# Patient Record
Sex: FEMALE | Race: BLACK OR AFRICAN AMERICAN | Employment: FULL TIME | URBAN - METROPOLITAN AREA
[De-identification: names, ages, dates, MRNs, and addresses within clinical notes are randomized per-mention and may not be internally consistent; named-entity substitution may affect disease eponyms.]

---

## 2017-05-18 ENCOUNTER — OFFICE VISIT (OUTPATIENT)
Dept: INTERNAL MEDICINE CLINIC | Age: 57
End: 2017-05-18

## 2017-05-18 VITALS
WEIGHT: 156.4 LBS | TEMPERATURE: 100 F | SYSTOLIC BLOOD PRESSURE: 125 MMHG | HEIGHT: 66 IN | OXYGEN SATURATION: 99 % | BODY MASS INDEX: 25.13 KG/M2 | HEART RATE: 72 BPM | DIASTOLIC BLOOD PRESSURE: 82 MMHG | RESPIRATION RATE: 16 BRPM

## 2017-05-18 DIAGNOSIS — J01.91 ACUTE RECURRENT SINUSITIS, UNSPECIFIED LOCATION: Primary | ICD-10-CM

## 2017-05-18 RX ORDER — AZITHROMYCIN 250 MG/1
TABLET, FILM COATED ORAL
Qty: 6 TAB | Refills: 0 | Status: SHIPPED | OUTPATIENT
Start: 2017-05-18 | End: 2017-05-23

## 2017-05-18 RX ORDER — HYDROCODONE POLISTIREX AND CHLORPHENIRAMINE POLISTIREX 10; 8 MG/5ML; MG/5ML
5 SUSPENSION, EXTENDED RELEASE ORAL
Qty: 115 ML | Refills: 0 | Status: SHIPPED | OUTPATIENT
Start: 2017-05-18 | End: 2017-10-03 | Stop reason: ALTCHOICE

## 2017-05-18 NOTE — LETTER
NOTIFICATION RETURN TO WORK / SCHOOL 
 
5/18/2017 11:45 AM 
 
[phone] [phone] To Whom It May Concern: 
 
Yesenia Almazan is currently under the care of [facility]. She will return to work/school on: 5/19/17 If there are questions or concerns please have the patient contact our office. Sincerely, Rocky Hicks,

## 2017-05-18 NOTE — PATIENT INSTRUCTIONS
Sinusitis: Care Instructions  Your Care Instructions    Sinusitis is an infection of the lining of the sinus cavities in your head. Sinusitis often follows a cold. It causes pain and pressure in your head and face. In most cases, sinusitis gets better on its own in 1 to 2 weeks. But some mild symptoms may last for several weeks. Sometimes antibiotics are needed. Follow-up care is a key part of your treatment and safety. Be sure to make and go to all appointments, and call your doctor if you are having problems. It's also a good idea to know your test results and keep a list of the medicines you take. How can you care for yourself at home? · Take an over-the-counter pain medicine, such as acetaminophen (Tylenol), ibuprofen (Advil, Motrin), or naproxen (Aleve). Read and follow all instructions on the label. · If the doctor prescribed antibiotics, take them as directed. Do not stop taking them just because you feel better. You need to take the full course of antibiotics. · Be careful when taking over-the-counter cold or flu medicines and Tylenol at the same time. Many of these medicines have acetaminophen, which is Tylenol. Read the labels to make sure that you are not taking more than the recommended dose. Too much acetaminophen (Tylenol) can be harmful. · Breathe warm, moist air from a steamy shower, a hot bath, or a sink filled with hot water. Avoid cold, dry air. Using a humidifier in your home may help. Follow the directions for cleaning the machine. · Use saline (saltwater) nasal washes to help keep your nasal passages open and wash out mucus and bacteria. You can buy saline nose drops at a grocery store or drugstore. Or you can make your own at home by adding 1 teaspoon of salt and 1 teaspoon of baking soda to 2 cups of distilled water. If you make your own, fill a bulb syringe with the solution, insert the tip into your nostril, and squeeze gently. [place] Maffucci your nose.   · Put a hot, wet towel or a warm gel pack on your face 3 or 4 times a day for 5 to 10 minutes each time. · Try a decongestant nasal spray like oxymetazoline (Afrin). Do not use it for more than 3 days in a row. Using it for more than 3 days can make your congestion worse. When should you call for help? Call your doctor now or seek immediate medical care if:  · You have new or worse swelling or redness in your face or around your eyes. · You have a new or higher fever. Watch closely for changes in your health, and be sure to contact your doctor if:  · You have new or worse facial pain. · The mucus from your nose becomes thicker (like pus) or has new blood in it. · You are not getting better as expected. Where can you learn more? Go to http://matt-hannah.info/. Enter C735 in the search box to learn more about \"Sinusitis: Care Instructions. \"  Current as of: July 29, 2016  Content Version: 11.2  © 6274-2492 Healthwise, Incorporated. Care instructions adapted under license by [employer] (which disclaims liability or warranty for this information). If you have questions about a medical condition or this instruction, always ask your healthcare professional. [place] [address] any warranty or liability for your use of this information.

## 2017-05-18 NOTE — PROGRESS NOTES
HISTORY OF PRESENT ILLNESS  Mai Buchanan is a 62 y.o. female. HPI  Upper Respiratory Infection  Patient complains of possible sinusitis. Symptoms include congestion, sore throat and cough. Onset of symptoms was 7 weeks ago, unchanged since that time. She also c/o headache described as frontal, low grade fever and sore throat. She is drinking moderate amounts of fluids. . Evaluation to date: none. Treatment to date: Ibuprofen and home remedies with some symptom improvement. +sick contact is her boss    Allergies   Allergen Reactions    Vibramycin [Doxycycline Calcium] Nausea Only       Past Medical History:   Diagnosis Date    Anxiety 10/20/2010    Headache     Hypercholesterolemia     Hypertension        Family History   Problem Relation Age of Onset    Breast Cancer Sister     Breast Cancer Sister        Social History   Substance Use Topics    Smoking status: Never Smoker    Smokeless tobacco: Never Used    Alcohol use No        Current Outpatient Prescriptions   Medication Sig    azithromycin (ZITHROMAX) 250 mg tablet Take 2 tablets today, then take 1 tablet daily    chlorpheniramine-HYDROcodone (TUSSIONEX) 10-8 mg/5 mL suspension Take 5 mL by mouth every twelve (12) hours as needed for Cough. Max Daily Amount: 10 mL.  multivitamin with iron-mineral 0.8 mg Cmpk Take  by mouth.  fluticasone (FLONASE) 50 mcg/actuation nasal spray 2 Sprays by Both Nostrils route daily.  cetirizine (ZYRTEC) 10 mg tablet Take 1 Tab by mouth daily as needed for Allergies.  butalbital-acetaminophen-caffeine (FIORICET, ESGIC) -40 mg per tablet Take 1 tablet by mouth every six (6) hours as needed for Headache.  fluocinoNIDE (LIDEX) 0.05 % topical cream Apply  to affected area two (2) times a day.  FOLIC ACID PO Take  by mouth.  UBIDECARENONE (CO Q-10 PO) Take  by mouth. No current facility-administered medications for this visit.          Past Surgical History:   Procedure Laterality Date    HX GYN      etopic pregnancy     ROS  See HPI    Visit Vitals    /82 (BP 1 Location: Left arm)    Pulse 72    Temp 100 °F (37.8 °C) (Oral)    Resp 16    Ht 5' 6\" (1.676 m)    Wt 156 lb 6.4 oz (70.9 kg)    LMP 05/03/2016    SpO2 99%    BMI 25.24 kg/m2     Physical Exam   HENT:   Right Ear: No middle ear effusion. Left Ear:  No middle ear effusion. Nose: Mucosal edema present. No rhinorrhea. Right sinus exhibits no maxillary sinus tenderness and no frontal sinus tenderness. Left sinus exhibits no maxillary sinus tenderness and no frontal sinus tenderness. Mouth/Throat: Mucous membranes are normal. Posterior oropharyngeal erythema present. Cardiovascular: Normal rate and regular rhythm. Pulmonary/Chest: Effort normal and breath sounds normal.       ASSESSMENT and PLAN    ICD-10-CM ICD-9-CM    1. Acute recurrent sinusitis, unspecified location J01.91 461.9 azithromycin (ZITHROMAX) 250 mg tablet      chlorpheniramine-HYDROcodone (TUSSIONEX) 10-8 mg/5 mL suspension     -Advised symptomatic care  -PCP to address HM  -Work note provided  -RTC prn    Additional Instructions: The patient understands that they should contact the office at any time if any questions or concerns develop. They are also aware that they can call our main office number at [phone] at any time if they would like to address any concerns with the physician. They also understand that they should dial 911 if any acute emergency arises. The patient understands that they should give us a minimum of 48 hours to complete prescription refills once they are requested. The patient has also been instructed to contact us by calling the main office number if they have not received feedback within 2 weeks of having any tests completed.   The patient is a aware that they should read all package insert information when picking up the medications and that they should consult the pharmacist of a physician if they have any questions or concerns regarding the prescribed medications. Discussed with the patient new medications given and patient instructed to read pharmacy literature regarding side effects and drug interactions. Instructions for taking the medications were provided to the patient and the consequences of not taking it. Follow-up Disposition:   Return if symptoms worsen or fail to improve. Risk and benefits of new medication discussed in detail when indicated, patient was given the opportunity to ask questions   AVS provided  reviewed diet, exercise and weight control when indicated  Alarm signals discussed. ER precautions reviewed when indicated  Plan of care reviewed with patient. Understanding verbalized and they are in agreement with plan of care.      Paige Dennis DO

## 2017-05-18 NOTE — PROGRESS NOTES
Pt is here for cough, congestion, headache, slight fever x 1 week. Do you have an advance directive no  Request Pt bring a copy of advance directive for scanning. Do you want information on an advance directive no    Pt declined mychart activation. 1. Have you been to the ER, urgent care clinic since your last visit? Hospitalized since your last visit? No    2. Have you seen or consulted any other health care providers outside of the [address] [place] [address] since your last visit? Include any pap smears or colon screening.  No

## 2017-05-18 NOTE — MR AVS SNAPSHOT
Visit Information Date & Time Provider Department Dept. Phone Encounter #  
 5/18/2017 10:45 AM Dipika Paz DO [facility] [employer] [phone] Upcoming Health Maintenance Date Due Hepatitis C Screening 1960 DTaP/Tdap/Td series (1 - Tdap) 2/5/1981 PAP AKA CERVICAL CYTOLOGY 2/5/1981 FOBT Q 1 YEAR AGE 50-75 2/5/2010 INFLUENZA AGE 9 TO ADULT 8/1/2017 BREAST CANCER SCRN MAMMOGRAM 12/30/2017 Allergies as of 5/18/2017  Review Complete On: 5/18/2017 By: Dipika Paz DO Severity Noted Reaction Type Reactions Vibramycin [Doxycycline Calcium]  07/30/2010    Nausea Only Current Immunizations  Never Reviewed No immunizations on file. Not reviewed this visit You Were Diagnosed With   
  
 Codes Comments Acute recurrent sinusitis, unspecified location    -  Primary ICD-10-CM: J01.91 
ICD-9-CM: 461.9 Vitals BP Pulse Temp Resp Height(growth percentile) Weight(growth percentile) 125/82 (BP 1 Location: Left arm) 72 100 °F (37.8 °C) (Oral) 16 5' 6\" (1.676 m) 156 lb 6.4 oz (70.9 kg) LMP SpO2 BMI OB Status Smoking Status 05/03/2016 99% 25.24 kg/m2 Postmenopausal Never Smoker Vitals History BMI and BSA Data Body Mass Index Body Surface Area  
 25.24 kg/m 2 1.82 m 2 Preferred Pharmacy Pharmacy Name Phone [facility] [address] [place] [address] 32 Your Updated Medication List  
  
   
This list is accurate as of: 5/18/17 11:44 AM.  Always use your most recent med list.  
  
  
  
  
 azithromycin 250 mg tablet Commonly known as:  Quynh Joey Take 2 tablets today, then take 1 tablet daily  
  
 butalbital-acetaminophen-caffeine -40 mg per tablet Commonly known as:  Elsie Pickard Take 1 tablet by mouth every six (6) hours as needed for Headache. cetirizine 10 mg tablet Commonly known as:  ZYRTEC  
 Take 1 Tab by mouth daily as needed for Allergies. chlorpheniramine-HYDROcodone 10-8 mg/5 mL suspension Commonly known as:  Muniz Chhaya Take 5 mL by mouth every twelve (12) hours as needed for Cough. Max Daily Amount: 10 mL. CO Q-10 PO Take  by mouth. FLONASE 50 mcg/actuation nasal spray Generic drug:  fluticasone 2 Sprays by Both Nostrils route daily. fluocinoNIDE 0.05 % topical cream  
Commonly known as:  LIDEX Apply  to affected area two (2) times a day. FOLIC ACID PO Take  by mouth.  
  
 multivitamin with iron-mineral 0.8 mg Cmpk Take  by mouth. Prescriptions Printed Refills  
 chlorpheniramine-HYDROcodone (TUSSIONEX) 10-8 mg/5 mL suspension 0 Sig: Take 5 mL by mouth every twelve (12) hours as needed for Cough. Max Daily Amount: 10 mL. Class: Print Route: Oral  
  
Prescriptions Sent to Pharmacy Refills  
 azithromycin (ZITHROMAX) 250 mg tablet 0 Sig: Take 2 tablets today, then take 1 tablet daily Class: Normal  
 Pharmacy: [address] [place] [address] [facility] #: [phone] Patient Instructions Sinusitis: Care Instructions Your Care Instructions Sinusitis is an infection of the lining of the sinus cavities in your head. Sinusitis often follows a cold. It causes pain and pressure in your head and face. In most cases, sinusitis gets better on its own in 1 to 2 weeks. But some mild symptoms may last for several weeks. Sometimes antibiotics are needed. Follow-up care is a key part of your treatment and safety. Be sure to make and go to all appointments, and call your doctor if you are having problems. It's also a good idea to know your test results and keep a list of the medicines you take. How can you care for yourself at home? · Take an over-the-counter pain medicine, such as acetaminophen (Tylenol), ibuprofen (Advil, Motrin), or naproxen (Aleve).  Read and follow all instructions on the label. · If the doctor prescribed antibiotics, take them as directed. Do not stop taking them just because you feel better. You need to take the full course of antibiotics. · Be careful when taking over-the-counter cold or flu medicines and Tylenol at the same time. Many of these medicines have acetaminophen, which is Tylenol. Read the labels to make sure that you are not taking more than the recommended dose. Too much acetaminophen (Tylenol) can be harmful. · Breathe warm, moist air from a steamy shower, a hot bath, or a sink filled with hot water. Avoid cold, dry air. Using a humidifier in your home may help. Follow the directions for cleaning the machine. · Use saline (saltwater) nasal washes to help keep your nasal passages open and wash out mucus and bacteria. You can buy saline nose drops at a grocery store or drugstore. Or you can make your own at home by adding 1 teaspoon of salt and 1 teaspoon of baking soda to 2 cups of distilled water. If you make your own, fill a bulb syringe with the solution, insert the tip into your nostril, and squeeze gently. Tracey Redhead your nose. · Put a hot, wet towel or a warm gel pack on your face 3 or 4 times a day for 5 to 10 minutes each time. · Try a decongestant nasal spray like oxymetazoline (Afrin). Do not use it for more than 3 days in a row. Using it for more than 3 days can make your congestion worse. When should you call for help? Call your doctor now or seek immediate medical care if: 
· You have new or worse swelling or redness in your face or around your eyes. · You have a new or higher fever. Watch closely for changes in your health, and be sure to contact your doctor if: 
· You have new or worse facial pain. · The mucus from your nose becomes thicker (like pus) or has new blood in it. · You are not getting better as expected. Where can you learn more? Go to http://matt-hannah.info/. Enter X057 in the search box to learn more about \"Sinusitis: Care Instructions. \" Current as of: July 29, 2016 Content Version: 11.2 © 3285-0232 [employer]. Care instructions adapted under license by [employer] (which disclaims liability or warranty for this information). If you have questions about a medical condition or this instruction, always ask your healthcare professional. [address] any warranty or liability for your use of this information. Introducing [facility]! Clemente Smith introduces Newstag patient portal. Now you can access parts of your medical record, email your doctor's office, and request medication refills online. 1. In your internet browser, go to [email or web address] 2. Click on the First Time User? Click Here link in the Sign In box. You will see the New Member Sign Up page. 3. Enter your Newstag Access Code exactly as it appears below. You will not need to use this code after youve completed the sign-up process. If you do not sign up before the expiration date, you must request a new code. · Newstag Access Code: [email or web address] Expires: 8/16/2017 11:44 AM 
 
4. Enter the last four digits of your Social Security Number (xxxx) and Date of Birth (mm/dd/yyyy) as indicated and click Submit. You will be taken to the next sign-up page. 5. Create a Newstag ID. This will be your Newstag login ID and cannot be changed, so think of one that is secure and easy to remember. 6. Create a Newstag password. You can change your password at any time. 7. Enter your Password Reset Question and Answer. This can be used at a later time if you forget your password. 8. Enter your e-mail address. You will receive e-mail notification when new information is available in [address] 9. Click Sign Up. You can now view and download portions of your medical record. 10. Click the Download Summary menu link to download a portable copy of your medical information. If you have questions, please visit the Frequently Asked Questions section of the Moncai website. Remember, Moncai is NOT to be used for urgent needs. For medical emergencies, dial 911. Now available from your iPhone and Android! Please provide this summary of care documentation to your next provider. Your primary care clinician is listed as CONSTANZA MAC. If you have any questions after today's visit, please call [phone].

## 2017-05-18 NOTE — LETTER
NOTIFICATION RETURN TO WORK / SCHOOL 
 
5/18/2017 11:41 AM 
 
Ms. Cassi Preciado [facility] [phone]. [phone] To Whom It May Concern: 
 
Cassi Preciado is currently under the care of [facility]. She will return to work/school on: 5/20/17 If there are questions or concerns please have the patient contact our office. Sincerely, Ramiro Pedersen, DO

## 2017-09-27 ENCOUNTER — LAB ONLY (OUTPATIENT)
Dept: INTERNAL MEDICINE CLINIC | Age: 57
End: 2017-09-27

## 2017-09-27 ENCOUNTER — HOSPITAL ENCOUNTER (OUTPATIENT)
Dept: LAB | Age: 57
Discharge: HOME | End: 2017-09-27
Payer: OTHER GOVERNMENT

## 2017-09-27 DIAGNOSIS — Z00.00 PHYSICAL EXAM: Primary | ICD-10-CM

## 2017-09-27 DIAGNOSIS — Z00.00 PHYSICAL EXAM: ICD-10-CM

## 2017-09-27 LAB
ALBUMIN SERPL-MCNC: 3.9 G/DL (ref 3.4–5)
ALBUMIN/GLOB SERPL: 1.3 {RATIO} (ref 0.8–1.7)
ALP SERPL-CCNC: 74 U/L (ref 45–117)
ALT SERPL-CCNC: 42 U/L (ref 13–56)
ANION GAP SERPL CALC-SCNC: 8 MMOL/L (ref 3–18)
AST SERPL-CCNC: 21 U/L (ref 15–37)
BASOPHILS # BLD: 0 K/UL (ref 0–0.06)
BASOPHILS NFR BLD: 1 % (ref 0–2)
BILIRUB SERPL-MCNC: 0.3 MG/DL (ref 0.2–1)
BUN SERPL-MCNC: 13 MG/DL (ref 7–18)
BUN/CREAT SERPL: 18 (ref 12–20)
CALCIUM SERPL-MCNC: 8.9 MG/DL (ref 8.5–10.1)
CHLORIDE SERPL-SCNC: 105 MMOL/L (ref 100–108)
CHOLEST SERPL-MCNC: 204 MG/DL
CO2 SERPL-SCNC: 29 MMOL/L (ref 21–32)
CREAT SERPL-MCNC: 0.72 MG/DL (ref 0.6–1.3)
DIFFERENTIAL METHOD BLD: ABNORMAL
EOSINOPHIL # BLD: 0.1 K/UL (ref 0–0.4)
EOSINOPHIL NFR BLD: 1 % (ref 0–5)
ERYTHROCYTE [DISTWIDTH] IN BLOOD BY AUTOMATED COUNT: 14.5 % (ref 11.6–14.5)
GLOBULIN SER CALC-MCNC: 2.9 G/DL (ref 2–4)
GLUCOSE SERPL-MCNC: 82 MG/DL (ref 74–99)
HCT VFR BLD AUTO: 38.8 % (ref 35–45)
HDLC SERPL-MCNC: 72 MG/DL (ref 40–60)
HDLC SERPL: 2.8 {RATIO} (ref 0–5)
HGB BLD-MCNC: 12 G/DL (ref 12–16)
LDLC SERPL CALC-MCNC: 120.8 MG/DL (ref 0–100)
LIPID PROFILE,FLP: ABNORMAL
LYMPHOCYTES # BLD: 2 K/UL (ref 0.9–3.6)
LYMPHOCYTES NFR BLD: 53 % (ref 21–52)
MCH RBC QN AUTO: 30.5 PG (ref 24–34)
MCHC RBC AUTO-ENTMCNC: 30.9 G/DL (ref 31–37)
MCV RBC AUTO: 98.7 FL (ref 74–97)
MONOCYTES # BLD: 0.3 K/UL (ref 0.05–1.2)
MONOCYTES NFR BLD: 7 % (ref 3–10)
NEUTS SEG # BLD: 1.4 K/UL (ref 1.8–8)
NEUTS SEG NFR BLD: 38 % (ref 40–73)
PLATELET # BLD AUTO: 211 K/UL (ref 135–420)
PMV BLD AUTO: 10.5 FL (ref 9.2–11.8)
POTASSIUM SERPL-SCNC: 3.7 MMOL/L (ref 3.5–5.5)
PROT SERPL-MCNC: 6.8 G/DL (ref 6.4–8.2)
RBC # BLD AUTO: 3.93 M/UL (ref 4.2–5.3)
SODIUM SERPL-SCNC: 142 MMOL/L (ref 136–145)
TRIGL SERPL-MCNC: 56 MG/DL (ref ?–150)
VLDLC SERPL CALC-MCNC: 11.2 MG/DL
WBC # BLD AUTO: 3.8 K/UL (ref 4.6–13.2)

## 2017-09-27 PROCEDURE — 85025 COMPLETE CBC W/AUTO DIFF WBC: CPT | Performed by: INTERNAL MEDICINE

## 2017-09-27 PROCEDURE — 80053 COMPREHEN METABOLIC PANEL: CPT | Performed by: INTERNAL MEDICINE

## 2017-09-27 PROCEDURE — 80061 LIPID PANEL: CPT | Performed by: INTERNAL MEDICINE

## 2017-09-27 PROCEDURE — 36415 COLL VENOUS BLD VENIPUNCTURE: CPT | Performed by: INTERNAL MEDICINE

## 2017-10-03 ENCOUNTER — OFFICE VISIT (OUTPATIENT)
Dept: INTERNAL MEDICINE CLINIC | Age: 57
End: 2017-10-03

## 2017-10-03 VITALS
WEIGHT: 164 LBS | HEART RATE: 69 BPM | TEMPERATURE: 98.2 F | BODY MASS INDEX: 26.36 KG/M2 | RESPIRATION RATE: 18 BRPM | OXYGEN SATURATION: 98 % | DIASTOLIC BLOOD PRESSURE: 84 MMHG | HEIGHT: 66 IN | SYSTOLIC BLOOD PRESSURE: 132 MMHG

## 2017-10-03 DIAGNOSIS — L70.0 ACNE VULGARIS: ICD-10-CM

## 2017-10-03 DIAGNOSIS — J32.9 CHRONIC SINUSITIS, UNSPECIFIED LOCATION: ICD-10-CM

## 2017-10-03 DIAGNOSIS — Z00.00 WELL WOMAN EXAM (NO GYNECOLOGICAL EXAM): Primary | ICD-10-CM

## 2017-10-03 DIAGNOSIS — Z12.39 BREAST CANCER SCREENING: ICD-10-CM

## 2017-10-03 RX ORDER — CLINDAMYCIN AND BENZOYL PEROXIDE 10; 50 MG/G; MG/G
GEL TOPICAL 2 TIMES DAILY
Qty: 1 TUBE | Refills: 1 | Status: SHIPPED | OUTPATIENT
Start: 2017-10-03

## 2017-10-03 NOTE — PATIENT INSTRUCTIONS

## 2017-10-03 NOTE — PROGRESS NOTES
Patient is in the office today for a physical.  Patient would like to get a cream for her hand and for acne. 1. Have you been to the ER, urgent care clinic since your last visit? Hospitalized since your last visit? No    2. Have you seen or consulted any other health care providers outside of the [address] [place] [address] since your last visit? Include any pap smears or colon screening.  No

## 2017-10-03 NOTE — PROGRESS NOTES
.    Subjective:   62 y.o. female for Well Woman Check. Her gyne and breast care is done elsewhere by her Ob-Gyne physician. Patient Active Problem List   Diagnosis Code    Anxiety F41.9    AR (allergic rhinitis) J30.9     Current Outpatient Prescriptions   Medication Sig Dispense Refill    clindamycin-benzoyl peroxide (BENZACLIN) 1-5 % topical gel Apply  to affected area two (2) times a day. Apply to affected area after the skin has been cleansed and dried. 1 Tube 1    multivitamin with iron-mineral 0.8 mg Cmpk Take  by mouth.  fluticasone (FLONASE) 50 mcg/actuation nasal spray 2 Sprays by Both Nostrils route daily. 1 Bottle 2    cetirizine (ZYRTEC) 10 mg tablet Take 1 Tab by mouth daily as needed for Allergies. 30 Tab     butalbital-acetaminophen-caffeine (FIORICET, ESGIC) -40 mg per tablet Take 1 tablet by mouth every six (6) hours as needed for Headache. 30 tablet 0    fluocinoNIDE (LIDEX) 0.05 % topical cream Apply  to affected area two (2) times a day.  60 g 3        Lab Results   Component Value Date/Time    WBC 3.8 09/27/2017 09:00 AM    HGB 12.0 09/27/2017 09:00 AM    HCT 38.8 09/27/2017 09:00 AM    PLATELET 360 85/47/3752 09:00 AM    MCV 98.7 09/27/2017 09:00 AM     Lab Results   Component Value Date/Time    Cholesterol, total 204 09/27/2017 09:00 AM    HDL Cholesterol 72 09/27/2017 09:00 AM    LDL, calculated 120.8 09/27/2017 09:00 AM    Triglyceride 56 09/27/2017 09:00 AM    CHOL/HDL Ratio 2.8 09/27/2017 09:00 AM     Lab Results   Component Value Date/Time    Sodium 142 09/27/2017 09:00 AM    Potassium 3.7 09/27/2017 09:00 AM    Chloride 105 09/27/2017 09:00 AM    CO2 29 09/27/2017 09:00 AM    Anion gap 8 09/27/2017 09:00 AM    Glucose 82 09/27/2017 09:00 AM    BUN 13 09/27/2017 09:00 AM    Creatinine 0.72 09/27/2017 09:00 AM    BUN/Creatinine ratio 18 09/27/2017 09:00 AM    GFR est AA >60 09/27/2017 09:00 AM    GFR est non-AA >60 09/27/2017 09:00 AM    Calcium 8.9 09/27/2017 09:00 AM    Bilirubin, total 0.3 09/27/2017 09:00 AM    ALT (SGPT) 42 09/27/2017 09:00 AM    AST (SGOT) 21 09/27/2017 09:00 AM    Alk. phosphatase 74 09/27/2017 09:00 AM    Protein, total 6.8 09/27/2017 09:00 AM    Albumin 3.9 09/27/2017 09:00 AM    Globulin 2.9 09/27/2017 09:00 AM    A-G Ratio 1.3 09/27/2017 09:00 AM                 ROS: Feeling generally well. No TIA's or unusual headaches, no dysphagia. No prolonged cough. No dyspnea or chest pain on exertion. No abdominal pain, change in bowel habits, black or bloody stools. No urinary tract symptoms. No new or unusual musculoskeletal symptoms. Specific concerns today: pt has chronic sinusitis that has not improved with Flonase and zyrtec. She has recurrent exacerbations and this has been going on for many years. Pt has acne for which I will try a crm. If not improving consider antibiotics or refer to dermatology. Pt is under a lot of stress with her  who she is  from. She will try to f/u with a therapist and let us know if she needs a recommendation. Objective: The patient appears well, alert, oriented x 3, in no distress. Visit Vitals    /84 (BP 1 Location: Left arm, BP Patient Position: Sitting)    Pulse 69    Temp 98.2 °F (36.8 °C) (Oral)    Resp 18    Ht 5' 6\" (1.676 m)    Wt 164 lb (74.4 kg)    LMP 05/03/2016    SpO2 98%    BMI 26.47 kg/m2     ENT normal.  Neck supple. No adenopathy or thyromegaly. SAMMIE. Lungs are clear, good air entry, no wheezes, rhonchi or rales. S1 and S2 normal, no murmurs, regular rate and rhythm. Abdomen soft without tenderness, guarding, mass or organomegaly. Extremities show no edema, normal peripheral pulses. Neurological is normal, no focal findings. Breast and Pelvic exams are deferred. Assessment/Plan:   Well Woman  increase physical activity, call if any problems    ICD-10-CM ICD-9-CM    1. Well woman exam (no gynecological exam) Z00.00 V70.0     [V70.0]   2.  Chronic sinusitis, unspecified location J32.9 473.9 REFERRAL TO ENT-OTOLARYNGOLOGY   3. Breast cancer screening Z12.31 V76.10 LEOPOLDO MAMMO BI SCREENING INCL CAD   4. Acne vulgaris L70.0 706.1 Will treat with clindamycin-benzoyl peroxide (BENZACLIN) 1-5 % topical gel     Follow-up Disposition:  Return in about 1 year (around 10/3/2018) for physical.    Reviewed plan of care. Patient has provided input and agrees with goals.

## 2017-10-03 NOTE — LETTER
NOTIFICATION RETURN TO WORK / SCHOOL 
 
10/3/2017 11:59 AM 
 
Ms. Lisa Early [facility] [phone]. [phone] To Whom It May Concern: 
 
Lisa Early is currently under the care of [facility]. She will return to work/school on: 10/3/17 If there are questions or concerns please have the patient contact our office. Sincerely, Hamlet De La Fuente MD

## 2017-10-03 NOTE — MR AVS SNAPSHOT
Visit Information Date & Time Provider Department Dept. Phone Encounter #  
 10/3/2017 11:00 AM Wil Perez MD [facility] [id number] Follow-up Instructions Return in about 1 year (around 10/3/2018) for physical.  
  
Upcoming Health Maintenance Date Due Hepatitis C Screening 1960 DTaP/Tdap/Td series (1 - Tdap) 2/5/1981 PAP AKA CERVICAL CYTOLOGY 2/5/1981 INFLUENZA AGE 9 TO ADULT 8/1/2017 BREAST CANCER SCRN MAMMOGRAM 12/30/2017 COLONOSCOPY 2/28/2022 Allergies as of 10/3/2017  Review Complete On: 10/3/2017 By: Wil Perez MD  
  
 Severity Noted Reaction Type Reactions Vibramycin [Doxycycline Calcium]  07/30/2010    Nausea Only Current Immunizations  Never Reviewed No immunizations on file. Not reviewed this visit You Were Diagnosed With   
  
 Codes Comments Well woman exam (no gynecological exam)    -  Primary ICD-10-CM: Z00.00 ICD-9-CM: V70.0 [V70.0] Chronic sinusitis, unspecified location     ICD-10-CM: J32.9 ICD-9-CM: 473.9 Breast cancer screening     ICD-10-CM: Z12.31 
ICD-9-CM: V76.10 Acne vulgaris     ICD-10-CM: L70.0 ICD-9-CM: 706.1 Vitals BP Pulse Temp Resp Height(growth percentile) Weight(growth percentile) 132/84 (BP 1 Location: Left arm, BP Patient Position: Sitting) 69 98.2 °F (36.8 °C) (Oral) 18 5' 6\" (1.676 m) 164 lb (74.4 kg) LMP SpO2 BMI OB Status Smoking Status 05/03/2016 98% 26.47 kg/m2 Postmenopausal Never Smoker Vitals History BMI and BSA Data Body Mass Index Body Surface Area  
 26.47 kg/m 2 1.86 m 2 Preferred Pharmacy Pharmacy Name Phone [facility] Your Updated Medication List  
  
   
This list is accurate as of: 10/3/17 12:01 PM.  Always use your most recent med list.  
  
  
  
  
 butalbital-acetaminophen-caffeine -40 mg per tablet Commonly known as:  Aalnna Halls Take 1 tablet by mouth every six (6) hours as needed for Headache. cetirizine 10 mg tablet Commonly known as:  ZYRTEC Take 1 Tab by mouth daily as needed for Allergies. clindamycin-benzoyl peroxide 1-5 % topical gel Commonly known as:  Yaneli Maple Apply  to affected area two (2) times a day. Apply to affected area after the skin has been cleansed and dried. FLONASE 50 mcg/actuation nasal spray Generic drug:  fluticasone 2 Sprays by Both Nostrils route daily. fluocinoNIDE 0.05 % topical cream  
Commonly known as:  LIDEX Apply  to affected area two (2) times a day. multivitamin with iron-mineral 0.8 mg Cmpk Take  by mouth. Prescriptions Sent to Pharmacy Refills  
 clindamycin-benzoyl peroxide (BENZACLIN) 1-5 % topical gel 1 Sig: Apply  to affected area two (2) times a day. Apply to affected area after the skin has been cleansed and dried. Class: Normal  
 Pharmacy: [id number] [place] [address] #: [phone] Route: Topical  
  
We Performed the Following REFERRAL TO ENT-OTOLARYNGOLOGY [JZR33 Custom] Comments:  
 Refer for chronic sinusitis Follow-up Instructions Return in about 1 year (around 10/3/2018) for physical.  
  
To-Do List   
 01/02/2018 Imaging:  LEOPOLDO MAMMO BI SCREENING INCL CAD Referral Information Referral ID Referred By Referred To  
  
 [id number] CONSTANZA MAC [facility] [address] [place], [facility] [address] Phone: [phone] Fax: [phone] Visits Status Start Date End Date 1 New Request 10/3/17 10/3/18 If your referral has a status of pending review or denied, additional information will be sent to support the outcome of this decision. Patient Instructions A Healthy Lifestyle: Care Instructions Your Care Instructions A healthy lifestyle can help you feel good, stay at a healthy weight, and have plenty of energy for both work and play. A healthy lifestyle is something you can share with your whole family. A healthy lifestyle also can lower your risk for serious health problems, such as high blood pressure, heart disease, and diabetes. You can follow a few steps listed below to improve your health and the health of your family. Follow-up care is a key part of your treatment and safety. Be sure to make and go to all appointments, and call your doctor if you are having problems. Its also a good idea to know your test results and keep a list of the medicines you take. How can you care for yourself at home? · Do not eat too much sugar, fat, or fast foods. You can still have dessert and treats now and then. The goal is moderation. · Start small to improve your eating habits. Pay attention to portion sizes, drink less juice and soda pop, and eat more fruits and vegetables. ¨ Eat a healthy amount of food. A 3-ounce serving of meat, for example, is about the size of a deck of cards. Fill the rest of your plate with vegetables and whole grains. ¨ Limit the amount of soda and sports drinks you have every day. Drink more water when you are thirsty. ¨ Eat at least 5 servings of fruits and vegetables every day. It may seem like a lot, but it is not hard to reach this goal. A serving or helping is 1 piece of fruit, 1 cup of vegetables, or 2 cups of leafy, raw vegetables. Have an apple or some carrot sticks as an afternoon snack instead of a candy bar. Try to have fruits and/or vegetables at every meal. 
· Make exercise part of your daily routine. You may want to start with simple activities, such as walking, bicycling, or slow swimming. Try to be active 30 to 60 minutes every day. You do not need to do all 30 to 60 minutes all at once.  For example, you can exercise 3 times a day for 10 or 20 minutes. Moderate exercise is safe for most people, but it is always a good idea to talk to your doctor before starting an exercise program. 
· Keep moving. Trey Tacho the lawn, work in the garden, or [employer]. Take the stairs instead of the elevator at work. · If you smoke, quit. People who smoke have an increased risk for heart attack, stroke, cancer, and other lung illnesses. Quitting is hard, but there are ways to boost your chance of quitting tobacco for good. ¨ Use nicotine gum, patches, or lozenges. ¨ Ask your doctor about stop-smoking programs and medicines. ¨ Keep trying. In addition to reducing your risk of diseases in the future, you will notice some benefits soon after you stop using tobacco. If you have shortness of breath or asthma symptoms, they will likely get better within a few weeks after you quit. · Limit how much alcohol you drink. Moderate amounts of alcohol (up to 2 drinks a day for men, 1 drink a day for women) are okay. But drinking too much can lead to liver problems, high blood pressure, and other health problems. Family health If you have a family, there are many things you can do together to improve your health. · Eat meals together as a family as often as possible. · Eat healthy foods. This includes fruits, vegetables, lean meats and dairy, and whole grains. · Include your family in your fitness plan. Most people think of activities such as jogging or tennis as the way to fitness, but there are many ways you and your family can be more active. Anything that makes you breathe hard and gets your heart pumping is exercise. Here are some tips: 
¨ Walk to do errands or to take your child to school or the bus. ¨ Go for a family bike ride after dinner instead of watching TV. Where can you learn more? Go to http://matt-hannah.info/. Enter K042 in the search box to learn more about \"A Healthy Lifestyle: Care Instructions. \" Current as of: July 26, 2016 Content Version: 11.3 © 8195-9419 [employer]. Care instructions adapted under license by [employer] (which disclaims liability or warranty for this information). If you have questions about a medical condition or this instruction, always ask your healthcare professional. [address] any warranty or liability for your use of this information. Introducing [facility]! Streeter Salinas introduces OneTouchEMR patient portal. Now you can access parts of your medical record, email your doctor's office, and request medication refills online. 1. In your internet browser, go to [email or web address] 2. Click on the First Time User? Click Here link in the Sign In box. You will see the New Member Sign Up page. 3. Enter your OneTouchEMR Access Code exactly as it appears below. You will not need to use this code after youve completed the sign-up process. If you do not sign up before the expiration date, you must request a new code. · OneTouchEMR Access Code: [email or web address] Expires: 1/1/2018 11:15 AM 
 
4. Enter the last four digits of your Social Security Number (xxxx) and Date of Birth (mm/dd/yyyy) as indicated and click Submit. You will be taken to the next sign-up page. 5. Create a OneTouchEMR ID. This will be your OneTouchEMR login ID and cannot be changed, so think of one that is secure and easy to remember. 6. Create a OneTouchEMR password. You can change your password at any time. 7. Enter your Password Reset Question and Answer. This can be used at a later time if you forget your password. 8. Enter your e-mail address. You will receive e-mail notification when new information is available in [address] 9. Click Sign Up. You can now view and download portions of your medical record. 10. Click the Download Summary menu link to download a portable copy of your medical information. If you have questions, please visit the Frequently Asked Questions section of the nuevoStaget website. Remember, virocyt is NOT to be used for urgent needs. For medical emergencies, dial 911. Now available from your iPhone and Android! Please provide this summary of care documentation to your next provider. Your primary care clinician is listed as CONSTANZA MAC. If you have any questions after today's visit, please call [phone].

## 2017-10-13 ENCOUNTER — HOSPITAL ENCOUNTER (OUTPATIENT)
Dept: MAMMOGRAPHY | Age: 57
Discharge: HOME | End: 2017-10-13
Attending: INTERNAL MEDICINE
Payer: COMMERCIAL

## 2017-10-13 DIAGNOSIS — Z12.39 BREAST CANCER SCREENING: ICD-10-CM

## 2017-10-13 PROCEDURE — 77067 SCR MAMMO BI INCL CAD: CPT

## 2018-01-25 ENCOUNTER — OFFICE VISIT (OUTPATIENT)
Dept: FAMILY MEDICINE CLINIC | Age: 58
End: 2018-01-25

## 2018-01-25 VITALS
HEART RATE: 79 BPM | BODY MASS INDEX: 26.55 KG/M2 | DIASTOLIC BLOOD PRESSURE: 91 MMHG | TEMPERATURE: 100.3 F | RESPIRATION RATE: 18 BRPM | SYSTOLIC BLOOD PRESSURE: 154 MMHG | WEIGHT: 165.2 LBS | OXYGEN SATURATION: 95 % | HEIGHT: 66 IN

## 2018-01-25 DIAGNOSIS — R68.89 FLU-LIKE SYMPTOMS: Primary | ICD-10-CM

## 2018-01-25 DIAGNOSIS — R11.0 NAUSEA: ICD-10-CM

## 2018-01-25 DIAGNOSIS — R09.81 NASAL CONGESTION: ICD-10-CM

## 2018-01-25 DIAGNOSIS — R05.9 COUGH: ICD-10-CM

## 2018-01-25 DIAGNOSIS — J20.9 ACUTE BRONCHITIS, UNSPECIFIED ORGANISM: ICD-10-CM

## 2018-01-25 LAB
QUICKVUE INFLUENZA TEST: NEGATIVE
VALID INTERNAL CONTROL?: YES

## 2018-01-25 RX ORDER — HYDROCODONE POLISTIREX AND CHLORPHENIRAMINE POLISTIREX 10; 8 MG/5ML; MG/5ML
1 SUSPENSION, EXTENDED RELEASE ORAL
Qty: 240 ML | Refills: 0 | Status: SHIPPED | OUTPATIENT
Start: 2018-01-25 | End: 2019-01-03

## 2018-01-25 RX ORDER — ONDANSETRON 4 MG/1
4 TABLET, ORALLY DISINTEGRATING ORAL
Qty: 30 TAB | Refills: 0 | Status: SHIPPED | OUTPATIENT
Start: 2018-01-25

## 2018-01-25 RX ORDER — CETIRIZINE HCL 10 MG
10 TABLET ORAL DAILY
Qty: 30 TAB | Refills: 0 | Status: SHIPPED | OUTPATIENT
Start: 2018-01-25

## 2018-01-25 RX ORDER — PREDNISONE 10 MG/1
TABLET ORAL
Qty: 21 TAB | Refills: 0 | Status: SHIPPED | OUTPATIENT
Start: 2018-01-25 | End: 2019-01-03

## 2018-01-25 RX ORDER — CEFDINIR 300 MG/1
300 CAPSULE ORAL 2 TIMES DAILY
Qty: 20 CAP | Refills: 0 | Status: SHIPPED | OUTPATIENT
Start: 2018-01-25 | End: 2018-02-04

## 2018-01-25 NOTE — PROGRESS NOTES
Chief Complaint   Patient presents with    Flu   Patient is here today for FLu x 4 days. With body aches and fever. 1. Have you been to the ER, urgent care clinic since your last visit? Hospitalized since your last visit? No    2. Have you seen or consulted any other health care providers outside of the [address] [place] [address] since your last visit? Include any pap smears or colon screening.  Yes Dr Mariely Hillman ENT

## 2018-01-25 NOTE — PATIENT INSTRUCTIONS
Influenza (Flu): Care Instructions  Your Care Instructions    Influenza (flu) is an infection in the lungs and breathing passages. It is caused by the influenza virus. There are different strains, or types, of the flu virus from year to year. Unlike the common cold, the flu comes on suddenly and the symptoms, such as a cough, congestion, fever, chills, fatigue, aches, and pains, are more severe. These symptoms may last up to 10 days. Although the flu can make you feel very sick, it usually doesn't cause serious health problems. Home treatment is usually all you need for flu symptoms. But your doctor may prescribe antiviral medicine to prevent other health problems, such as pneumonia, from developing. Older people and those who have a long-term health condition, such as lung disease, are most at risk for having pneumonia or other health problems. Follow-up care is a key part of your treatment and safety. Be sure to make and go to all appointments, and call your doctor if you are having problems. It's also a good idea to know your test results and keep a list of the medicines you take. How can you care for yourself at home? · Get plenty of rest.  · Drink plenty of fluids, enough so that your urine is light yellow or clear like water. If you have kidney, heart, or liver disease and have to limit fluids, talk with your doctor before you increase the amount of fluids you drink. · Take an over-the-counter pain medicine if needed, such as acetaminophen (Tylenol), ibuprofen (Advil, Motrin), or naproxen (Aleve), to relieve fever, headache, and muscle aches. Read and follow all instructions on the label. No one younger than 20 should take aspirin. It has been linked to Reye syndrome, a serious illness. · Do not smoke. Smoking can make the flu worse. If you need help quitting, talk to your doctor about stop-smoking programs and medicines. These can increase your chances of quitting for good.   · Breathe moist air from a hot shower or from a sink filled with hot water to help clear a stuffy nose. · Before you use cough and cold medicines, check the label. These medicines may not be safe for young children or for people with certain health problems. · If the skin around your nose and lips becomes sore, put some petroleum jelly on the area. · To ease coughing:  ¨ Drink fluids to soothe a scratchy throat. ¨ Suck on cough drops or plain hard candy. ¨ Take an over-the-counter cough medicine that contains dextromethorphan to help you get some sleep. Read and follow all instructions on the label. ¨ Raise your head at night with an extra pillow. This may help you rest if coughing keeps you awake. · Take any prescribed medicine exactly as directed. Call your doctor if you think you are having a problem with your medicine. To avoid spreading the flu  · Wash your hands regularly, and keep your hands away from your face. · Stay home from school, work, and other public places until you are feeling better and your fever has been gone for at least 24 hours. The fever needs to have gone away on its own without the help of medicine. · Ask people living with you to talk to their doctors about preventing the flu. They may get antiviral medicine to keep from getting the flu from you. · To prevent the flu in the future, get a flu vaccine every fall. Encourage people living with you to get the vaccine. · Cover your mouth when you cough or sneeze. When should you call for help? Call 911 anytime you think you may need emergency care. For example, call if:  ? · You have severe trouble breathing. ?Call your doctor now or seek immediate medical care if:  ? · You have new or worse trouble breathing. ? · You seem to be getting much sicker. ? · You feel very sleepy or confused. ? · You have a new or higher fever. ? · You get a new rash. ? Watch closely for changes in your health, and be sure to contact your doctor if:  ? · You begin to get better and then get worse. ? · You are not getting better after 1 week. Where can you learn more? Go to http://matt-hannah.info/. Enter C435 in the search box to learn more about \"Influenza (Flu): Care Instructions. \"  Current as of: May 12, 2017  Content Version: 11.4  © 8585-1549 [employer]. Care instructions adapted under license by [employer] (which disclaims liability or warranty for this information). If you have questions about a medical condition or this instruction, always ask your healthcare professional. [address] any warranty or liability for your use of this information. Bronchitis: Care Instructions  Your Care Instructions    Bronchitis is inflammation of the bronchial tubes, which carry air to the lungs. The tubes swell and produce mucus, or phlegm. The mucus and inflamed bronchial tubes make you cough. You may have trouble breathing. Most cases of bronchitis are caused by viruses like those that cause colds. Antibiotics usually do not help and they may be harmful. Bronchitis usually develops rapidly and lasts about 2 to 3 weeks in otherwise healthy people. Follow-up care is a key part of your treatment and safety. Be sure to make and go to all appointments, and call your doctor if you are having problems. It's also a good idea to know your test results and keep a list of the medicines you take. How can you care for yourself at home? · Take all medicines exactly as prescribed. Call your doctor if you think you are having a problem with your medicine. · Get some extra rest.  · Take an over-the-counter pain medicine, such as acetaminophen (Tylenol), ibuprofen (Advil, Motrin), or naproxen (Aleve) to reduce fever and relieve body aches. Read and follow all instructions on the label. · Do not take two or more pain medicines at the same time unless the doctor told you to.  Many pain medicines have acetaminophen, which is Tylenol. Too much acetaminophen (Tylenol) can be harmful. · Take an over-the-counter cough medicine that contains dextromethorphan to help quiet a dry, hacking cough so that you can sleep. Avoid cough medicines that have more than one active ingredient. Read and follow all instructions on the label. · Breathe moist air from a humidifier, hot shower, or sink filled with hot water. The heat and moisture will thin mucus so you can cough it out. · Do not smoke. Smoking can make bronchitis worse. If you need help quitting, talk to your doctor about stop-smoking programs and medicines. These can increase your chances of quitting for good. When should you call for help? Call 911 anytime you think you may need emergency care. For example, call if:  ? · You have severe trouble breathing. ?Call your doctor now or seek immediate medical care if:  ? · You have new or worse trouble breathing. ? · You cough up dark brown or bloody mucus (sputum). ? · You have a new or higher fever. ? · You have a new rash. ? Watch closely for changes in your health, and be sure to contact your doctor if:  ? · You cough more deeply or more often, especially if you notice more mucus or a change in the color of your mucus. ? · You are not getting better as expected. Where can you learn more? Go to http://matt-hannah.info/. Enter H333 in the search box to learn more about \"Bronchitis: Care Instructions. \"  Current as of: May 12, 2017  Content Version: 11.4  © 7368-9817 [employer]. Care instructions adapted under license by [employer] (which disclaims liability or warranty for this information). If you have questions about a medical condition or this instruction, always ask your healthcare professional. [address] any warranty or liability for your use of this information.

## 2018-01-25 NOTE — MR AVS SNAPSHOT
[facility] 
 
 
 [address] [address] [address] [address] 
[phone] Patient: Eva Alvarez MRN: [id number] XYE:[birth year] Visit Information Date & Time Provider Department Dept. Phone Encounter #  
 1/25/2018 10:30  [address] [phone] [id number] Upcoming Health Maintenance Date Due Hepatitis C Screening 1960 DTaP/Tdap/Td series (1 - Tdap) 2/5/1981 PAP AKA CERVICAL CYTOLOGY 2/5/1981 Influenza Age 5 to Adult 8/1/2017 BREAST CANCER SCRN MAMMOGRAM 10/13/2019 COLONOSCOPY 2/28/2022 Allergies as of 1/25/2018  Review Complete On: 1/25/2018 By: Lillette Lombard, LPN Severity Noted Reaction Type Reactions Vibramycin [Doxycycline Calcium]  07/30/2010    Nausea Only Current Immunizations  Never Reviewed No immunizations on file. Not reviewed this visit You Were Diagnosed With   
  
 Codes Comments Flu-like symptoms    -  Primary ICD-10-CM: R68.89 ICD-9-CM: 780.99 Acute bronchitis, unspecified organism     ICD-10-CM: J20.9 ICD-9-CM: 466.0 Cough     ICD-10-CM: R05 ICD-9-CM: 786.2 Nasal congestion     ICD-10-CM: R09.81 ICD-9-CM: 478.19 Nausea     ICD-10-CM: R11.0 ICD-9-CM: 787.02 Vitals BP Pulse Temp Resp Height(growth percentile) Weight(growth percentile) (!) 154/91 (BP 1 Location: Left arm, BP Patient Position: Sitting) 79 100.3 °F (37.9 °C) (Oral) 18 5' 6\" (1.676 m) 165 lb 3.2 oz (74.9 kg) LMP SpO2 BMI OB Status Smoking Status 05/03/2016 95% 26.66 kg/m2 Postmenopausal Never Smoker Vitals History BMI and BSA Data Body Mass Index Body Surface Area  
 26.66 kg/m 2 1.87 m 2 Preferred Pharmacy Pharmacy Name Phone [address] [place] [address], [address] [place] [phone] Your Updated Medication List  
  
   
 This list is accurate as of: 1/25/18 11:30 AM.  Always use your most recent med list.  
  
  
  
  
 butalbital-acetaminophen-caffeine -40 mg per tablet Commonly known as:  Audrene Grooms Take 1 tablet by mouth every six (6) hours as needed for Headache. cefdinir 300 mg capsule Commonly known as:  OMNICEF Take 1 Cap by mouth two (2) times a day for 10 days. * cetirizine 10 mg tablet Commonly known as:  ZYRTEC Take 1 Tab by mouth daily as needed for Allergies. * cetirizine 10 mg tablet Commonly known as:  ZYRTEC Take 1 Tab by mouth daily. chlorpheniramine-HYDROcodone 10-8 mg/5 mL suspension Commonly known as:  Shaaron Linear Take 5 mL by mouth every twelve (12) hours as needed for Cough. Max Daily Amount: 10 mL. clindamycin-benzoyl peroxide 1-5 % topical gel Commonly known as:  Althia Forts Apply  to affected area two (2) times a day. Apply to affected area after the skin has been cleansed and dried. FLONASE 50 mcg/actuation nasal spray Generic drug:  fluticasone 2 Sprays by Both Nostrils route daily. fluocinoNIDE 0.05 % topical cream  
Commonly known as:  LIDEX Apply  to affected area two (2) times a day. multivitamin with iron-mineral 0.8 mg Cmpk Take  by mouth. ondansetron 4 mg disintegrating tablet Commonly known as:  ZOFRAN ODT Take 1 Tab by mouth every eight (8) hours as needed for Nausea. predniSONE 10 mg dose pack Commonly known as:  STERAPRED DS See administration instruction per 10mg dose pack * Notice: This list has 2 medication(s) that are the same as other medications prescribed for you. Read the directions carefully, and ask your doctor or other care provider to review them with you. Prescriptions Printed Refills  
 chlorpheniramine-HYDROcodone (TUSSIONEX) 10-8 mg/5 mL suspension 0 Sig: Take 5 mL by mouth every twelve (12) hours as needed for Cough. Max Daily Amount: 10 mL. Class: Print Route: Oral  
  
Prescriptions Sent to Pharmacy Refills  
 cefdinir (OMNICEF) 300 mg capsule 0 Sig: Take 1 Cap by mouth two (2) times a day for 10 days. Class: Normal  
 Pharmacy: [facility] DR ROBIN MITCHELL [address] [place] [address] [facility] Ph #: [phone] Route: Oral  
 predniSONE (STERAPRED DS) 10 mg dose pack 0 Sig: See administration instruction per 10mg dose pack Class: Normal  
 Pharmacy: [facility] DR ROBIN MITCHELL [address] [facility] Ph #: [phone]  
 cetirizine (ZYRTEC) 10 mg tablet 0 Sig: Take 1 Tab by mouth daily. Class: Normal  
 Pharmacy: [facility] DR ROBIN MITCHELL [address] [place] [address] [facility] Ph #: [phone] Route: Oral  
 ondansetron (ZOFRAN ODT) 4 mg disintegrating tablet 0 Sig: Take 1 Tab by mouth every eight (8) hours as needed for Nausea. Class: Normal  
 Pharmacy: [facility] DR ROBIN MITCHELL [address] [place] [address] [facility] Ph #: [phone] Route: Oral  
  
We Performed the Following AMB POC RAPID INFLUENZA TEST [79372 CPT(R)] Patient Instructions Influenza (Flu): Care Instructions Your Care Instructions Influenza (flu) is an infection in the lungs and breathing passages. It is caused by the influenza virus. There are different strains, or types, of the flu virus from year to year. Unlike the common cold, the flu comes on suddenly and the symptoms, such as a cough, congestion, fever, chills, fatigue, aches, and pains, are more severe. These symptoms may last up to 10 days. Although the flu can make you feel very sick, it usually doesn't cause serious health problems. Home treatment is usually all you need for flu symptoms. But your doctor may prescribe antiviral medicine to prevent other health problems, such as pneumonia, from developing.  Older people and those who have a long-term health condition, such as lung disease, are most at risk for having pneumonia or other health problems. Follow-up care is a key part of your treatment and safety. Be sure to make and go to all appointments, and call your doctor if you are having problems. It's also a good idea to know your test results and keep a list of the medicines you take. How can you care for yourself at home? · Get plenty of rest. 
· Drink plenty of fluids, enough so that your urine is light yellow or clear like water. If you have kidney, heart, or liver disease and have to limit fluids, talk with your doctor before you increase the amount of fluids you drink. · Take an over-the-counter pain medicine if needed, such as acetaminophen (Tylenol), ibuprofen (Advil, Motrin), or naproxen (Aleve), to relieve fever, headache, and muscle aches. Read and follow all instructions on the label. No one younger than 20 should take aspirin. It has been linked to Reye syndrome, a serious illness. · Do not smoke. Smoking can make the flu worse. If you need help quitting, talk to your doctor about stop-smoking programs and medicines. These can increase your chances of quitting for good. · Breathe moist air from a hot shower or from a sink filled with hot water to help clear a stuffy nose. · Before you use cough and cold medicines, check the label. These medicines may not be safe for young children or for people with certain health problems. · If the skin around your nose and lips becomes sore, put some petroleum jelly on the area. · To ease coughing: ¨ Drink fluids to soothe a scratchy throat. ¨ Suck on cough drops or plain hard candy. ¨ Take an over-the-counter cough medicine that contains dextromethorphan to help you get some sleep. Read and follow all instructions on the label. ¨ Raise your head at night with an extra pillow. This may help you rest if coughing keeps you awake. · Take any prescribed medicine exactly as directed. Call your doctor if you think you are having a problem with your medicine. To avoid spreading the flu · Wash your hands regularly, and keep your hands away from your face. · Stay home from school, work, and other public places until you are feeling better and your fever has been gone for at least 24 hours. The fever needs to have gone away on its own without the help of medicine. · Ask people living with you to talk to their doctors about preventing the flu. They may get antiviral medicine to keep from getting the flu from you. · To prevent the flu in the future, get a flu vaccine every fall. Encourage people living with you to get the vaccine. · Cover your mouth when you cough or sneeze. When should you call for help? Call 911 anytime you think you may need emergency care. For example, call if: 
? · You have severe trouble breathing. ?Call your doctor now or seek immediate medical care if: 
? · You have new or worse trouble breathing. ? · You seem to be getting much sicker. ? · You feel very sleepy or confused. ? · You have a new or higher fever. ? · You get a new rash. ? Watch closely for changes in your health, and be sure to contact your doctor if: 
? · You begin to get better and then get worse. ? · You are not getting better after 1 week. Where can you learn more? Go to http://matt-hannah.info/. Enter R958 in the search box to learn more about \"Influenza (Flu): Care Instructions. \" Current as of: May 12, 2017 Content Version: 11.4 © 4039-3622 [employer]. Care instructions adapted under license by [employer] (which disclaims liability or warranty for this information). If you have questions about a medical condition or this instruction, always ask your healthcare professional. [address] [address] any warranty or liability for your use of this information. Bronchitis: Care Instructions Your Care Instructions Bronchitis is inflammation of the bronchial tubes, which carry air to the lungs. The tubes swell and produce mucus, or phlegm. The mucus and inflamed bronchial tubes make you cough. You may have trouble breathing. Most cases of bronchitis are caused by viruses like those that cause colds. Antibiotics usually do not help and they may be harmful. Bronchitis usually develops rapidly and lasts about 2 to 3 weeks in otherwise healthy people. Follow-up care is a key part of your treatment and safety. Be sure to make and go to all appointments, and call your doctor if you are having problems. It's also a good idea to know your test results and keep a list of the medicines you take. How can you care for yourself at home? · Take all medicines exactly as prescribed. Call your doctor if you think you are having a problem with your medicine. · Get some extra rest. 
· Take an over-the-counter pain medicine, such as acetaminophen (Tylenol), ibuprofen (Advil, Motrin), or naproxen (Aleve) to reduce fever and relieve body aches. Read and follow all instructions on the label. · Do not take two or more pain medicines at the same time unless the doctor told you to. Many pain medicines have acetaminophen, which is Tylenol. Too much acetaminophen (Tylenol) can be harmful. · Take an over-the-counter cough medicine that contains dextromethorphan to help quiet a dry, hacking cough so that you can sleep. Avoid cough medicines that have more than one active ingredient. Read and follow all instructions on the label. · Breathe moist air from a humidifier, hot shower, or sink filled with hot water. The heat and moisture will thin mucus so you can cough it out. · Do not smoke. Smoking can make bronchitis worse. If you need help quitting, talk to your doctor about stop-smoking programs and medicines. These can increase your chances of quitting for good. When should you call for help? Call 911 anytime you think you may need emergency care. For example, call if: 
? · You have severe trouble breathing. ?Call your doctor now or seek immediate medical care if: 
? · You have new or worse trouble breathing. ? · You cough up dark brown or bloody mucus (sputum). ? · You have a new or higher fever. ? · You have a new rash. ? Watch closely for changes in your health, and be sure to contact your doctor if: 
? · You cough more deeply or more often, especially if you notice more mucus or a change in the color of your mucus. ? · You are not getting better as expected. Where can you learn more? Go to http://matt-hannah.info/. Enter H333 in the search box to learn more about \"Bronchitis: Care Instructions. \" Current as of: May 12, 2017 Content Version: 11.4 © 1679-1751 [employer]. Care instructions adapted under license by [employer] (which disclaims liability or warranty for this information). If you have questions about a medical condition or this instruction, always ask your healthcare professional. [address] [address] any warranty or liability for your use of this information. Introducing [facility] [place] [facility]! [facility] introduces [employer] patient portal. Now you can access parts of your medical record, email your doctor's office, and request medication refills online. 1. In your internet browser, go to [email or web address] 2. Click on the First Time User? Click Here link in the Sign In box. You will see the New Member Sign Up page. 3. Enter your [employer] Access Code exactly as it appears below. You will not need to use this code after youve completed the sign-up process. If you do not sign up before the expiration date, you must request a new code. · [employer] Access Code: [email or web address] Expires: 4/25/2018 11:30 AM 
 
4. Enter the last four digits of your Social Security Number (xxxx) and Date of Birth (mm/dd/yyyy) as indicated and click Submit. You will be taken to the next sign-up page. 5. Create a [employer] ID. This will be your [employer] login ID and cannot be changed, so think of one that is secure and easy to remember. 6. Create a [employer] password. You can change your password at any time. 7. Enter your Password Reset Question and Answer. This can be used at a later time if you forget your password. 8. Enter your e-mail address. You will receive e-mail notification when new information is available in [address] 9. Click Sign Up. You can now view and download portions of your medical record. 10. Click the Download Summary menu link to download a portable copy of your medical information. If you have questions, please visit the Frequently Asked Questions section of the [employer] website. Remember, [employer] is NOT to be used for urgent needs. For medical emergencies, dial 911. Now available from your iPhone and Android! Please provide this summary of care documentation to your next provider. Your primary care clinician is listed as CONSTANZA MAC. If you have any questions after today's visit, please call [phone].

## 2018-01-25 NOTE — PROGRESS NOTES
Chief Complaint   Patient presents with    Flu       HPI:  Patient is a 62year old female presents today for an acute visit with cold complaints. Symptoms started 4 days ago with body aches and associated is chest congestion, post nasal drip, nausea, nasal congestion, productive cough, fever with T max at home at 102 and currently low grade at 100.3 at the office today. She took Benadryl with no improvement and thus she came in today foe evaluation. Past Medical History:   Diagnosis Date    Anxiety 10/20/2010    Headache     Hypercholesterolemia     Hypertension      Allergies   Allergen Reactions    Vibramycin [Doxycycline Calcium] Nausea Only     Current Outpatient Prescriptions   Medication Sig Dispense Refill    cefdinir (OMNICEF) 300 mg capsule Take 1 Cap by mouth two (2) times a day for 10 days. 20 Cap 0    chlorpheniramine-HYDROcodone (TUSSIONEX) 10-8 mg/5 mL suspension Take 5 mL by mouth every twelve (12) hours as needed for Cough. Max Daily Amount: 10 mL. 240 mL 0    predniSONE (STERAPRED DS) 10 mg dose pack See administration instruction per 10mg dose pack 21 Tab 0    cetirizine (ZYRTEC) 10 mg tablet Take 1 Tab by mouth daily. 30 Tab 0    ondansetron (ZOFRAN ODT) 4 mg disintegrating tablet Take 1 Tab by mouth every eight (8) hours as needed for Nausea. 30 Tab 0    clindamycin-benzoyl peroxide (BENZACLIN) 1-5 % topical gel Apply  to affected area two (2) times a day. Apply to affected area after the skin has been cleansed and dried. 1 Tube 1    fluticasone (FLONASE) 50 mcg/actuation nasal spray 2 Sprays by Both Nostrils route daily. 1 Bottle 2    cetirizine (ZYRTEC) 10 mg tablet Take 1 Tab by mouth daily as needed for Allergies. 30 Tab     butalbital-acetaminophen-caffeine (FIORICET, ESGIC) -40 mg per tablet Take 1 tablet by mouth every six (6) hours as needed for Headache. 30 tablet 0    fluocinoNIDE (LIDEX) 0.05 % topical cream Apply  to affected area two (2) times a day.  60 g 3    multivitamin with iron-mineral 0.8 mg Cmpk Take  by mouth. ROS:  Pertinent as in HPI        Physical Exam:  Visit Vitals    BP (!) 154/91 (BP 1 Location: Left arm, BP Patient Position: Sitting)    Pulse 79    Temp 100.3 °F (37.9 °C) (Oral)    Resp 18    Ht 5' 6\" (1.676 m)    Wt 165 lb 3.2 oz (74.9 kg)    LMP 05/03/2016    SpO2 95%    BMI 26.66 kg/m2     General: a & o x 3, well-nourished, interacting appropriately, in no acute distress  HEENT: Mucosa edema and pharyngeal erythema noted  Respiratory: symmetrical chest expansion, lung sounds clear bilaterally, good air entry  Cardiovascular: normal S1S2, regular rate and rhythm      Assessment/Plan:    ICD-10-CM ICD-9-CM    1. Flu-like symptoms R68.89 780.99 Rapid flu done today was negative. AMB POC RAPID INFLUENZA TEST   2. Acute bronchitis, unspecified organism J20.9 466.0 cefdinir (OMNICEF) 300 mg capsule      predniSONE (STERAPRED DS) 10 mg dose pack   3. Cough R05 786.2 chlorpheniramine-HYDROcodone (TUSSIONEX) 10-8 mg/5 mL suspension   4. Nasal congestion R09.81 478.19 cetirizine (ZYRTEC) 10 mg tablet   5. Nausea R11.0 787.02 ondansetron (ZOFRAN ODT) 4 mg disintegrating tablet         Orders Placed This Encounter    AMB POC RAPID INFLUENZA TEST    cefdinir (OMNICEF) 300 mg capsule     Sig: Take 1 Cap by mouth two (2) times a day for 10 days. Dispense:  20 Cap     Refill:  0    chlorpheniramine-HYDROcodone (TUSSIONEX) 10-8 mg/5 mL suspension     Sig: Take 5 mL by mouth every twelve (12) hours as needed for Cough. Max Daily Amount: 10 mL. Dispense:  240 mL     Refill:  0    predniSONE (STERAPRED DS) 10 mg dose pack     Sig: See administration instruction per 10mg dose pack     Dispense:  21 Tab     Refill:  0    cetirizine (ZYRTEC) 10 mg tablet     Sig: Take 1 Tab by mouth daily.      Dispense:  30 Tab     Refill:  0    ondansetron (ZOFRAN ODT) 4 mg disintegrating tablet     Sig: Take 1 Tab by mouth every eight (8) hours as needed for Nausea. Dispense:  30 Tab     Refill:  0         Work note given      Additional Notes: Discussed today's diagnosis, treatment plans. Discussed medication indications and side effects. After Visit Summary: Discussed provided printed patient instructions. Answered questions accordingly.   Follow-up Disposition: As previously scheduled with PCP        Nunu Bain DO, MPH  Internal Medicine

## 2018-01-25 NOTE — LETTER
NOTIFICATION RETURN TO WORK  
 
1/25/2018 11:30 AM 
 
Ms. Lev Alvarez [facility] [address]. [phone] To Whom It May Concern: 
 
Lev Alvarez is currently under the care of [address] . She will return to work/ on: Friday 1/26/8 If there are questions or concerns please have the patient contact our office.  
 
 
 
Sincerely, 
 
 
[address]ica [address] DO

## 2018-10-31 ENCOUNTER — OFFICE VISIT (OUTPATIENT)
Dept: FAMILY MEDICINE CLINIC | Age: 58
End: 2018-10-31

## 2018-10-31 VITALS
TEMPERATURE: 98.6 F | SYSTOLIC BLOOD PRESSURE: 174 MMHG | DIASTOLIC BLOOD PRESSURE: 97 MMHG | OXYGEN SATURATION: 98 % | WEIGHT: 162 LBS | HEIGHT: 66 IN | HEART RATE: 62 BPM | RESPIRATION RATE: 20 BRPM | BODY MASS INDEX: 26.03 KG/M2

## 2018-10-31 DIAGNOSIS — R22.42 FOOT MASS, LEFT: ICD-10-CM

## 2018-10-31 DIAGNOSIS — M79.89 SWELLING OF LEFT FOOT: Primary | ICD-10-CM

## 2018-10-31 RX ORDER — CEPHALEXIN 500 MG/1
500 CAPSULE ORAL 3 TIMES DAILY
Qty: 30 CAP | Refills: 0 | Status: SHIPPED | OUTPATIENT
Start: 2018-10-31 | End: 2018-11-10

## 2018-10-31 NOTE — LETTER
NOTIFICATION RETURN TO WORK / SCHOOL 
 
10/31/2018 12:13 PM 
 
Ms. Mariel Ortega [address] [facility] [facility] [phone] To Whom It May Concern: 
 
Mariel Ortega is currently under the care of [address] She will return to work/school on: 11/5/2018 If there are questions or concerns please have the patient contact our office. Sincerely, Sumeet Page MD

## 2018-10-31 NOTE — PROGRESS NOTES
Patient is in the office today for leg swelling. 1. Have you been to the ER, urgent care clinic since your last visit? Hospitalized since your last visit? No 
 
2. Have you seen or consulted any other health care providers outside of the [address] [place] [address] since your last visit? Include any pap smears or colon screening.  No

## 2018-11-01 NOTE — PROGRESS NOTES
HISTORY OF PRESENT ILLNESS Khadijah Pereira is a 62 y.o. female. Pt for the last four weeks has had swelling and pain in her left foot and ankle. She initially had a nodule on the left ankle but never went to be evaluated. The swelling has improved but the ankle is still painful and she now has a growth on the # 3 digit of her left foot which is painful. She denies any fever or chills. Ankle swelling The history is provided by the patient. This is a new problem. The current episode started more than 1 week ago. The problem occurs constantly. The pain is present in the left foot. The pain is mild. Associated symptoms include limited range of motion. The symptoms are aggravated by movement. She has tried rest and OTC ointments for the symptoms. The treatment provided mild relief. There has been no history of extremity trauma. BP very high today. Pt will cut back salt in diet and f/u to have it rechecked. Review of Systems Constitutional: Negative for chills and fever. Physical Exam  
Musculoskeletal:  
     Left ankle: She exhibits decreased range of motion and swelling. Tenderness. Left foot: There is tenderness and swelling. Feet: 
 
 
 
ASSESSMENT and PLAN 
  ICD-10-CM ICD-9-CM 1. Swelling of left foot M79.89 729.81 Will treat with Keflex for possible foot infection REFERRAL TO PODIATRY 2. Foot mass, left R22.42 782.2 REFERRAL TO PODIATRY Reviewed plan of care. Patient has provided input and agrees with goals. Follow-up Disposition: 
Return in about 4 weeks (around 11/28/2018) for BP check.

## 2019-01-02 ENCOUNTER — OFFICE VISIT (OUTPATIENT)
Dept: FAMILY MEDICINE CLINIC | Age: 59
End: 2019-01-02

## 2019-01-02 ENCOUNTER — HOSPITAL ENCOUNTER (OUTPATIENT)
Dept: LAB | Age: 59
Discharge: HOME | End: 2019-01-02
Payer: COMMERCIAL

## 2019-01-02 VITALS
DIASTOLIC BLOOD PRESSURE: 96 MMHG | RESPIRATION RATE: 20 BRPM | SYSTOLIC BLOOD PRESSURE: 178 MMHG | OXYGEN SATURATION: 98 % | BODY MASS INDEX: 25.88 KG/M2 | TEMPERATURE: 97.5 F | HEART RATE: 78 BPM | HEIGHT: 66 IN | WEIGHT: 161 LBS

## 2019-01-02 DIAGNOSIS — I10 ESSENTIAL HYPERTENSION: Primary | ICD-10-CM

## 2019-01-02 DIAGNOSIS — M25.872 MASS OF JOINT OF LEFT FOOT: ICD-10-CM

## 2019-01-02 DIAGNOSIS — E87.6 HYPOKALEMIA: ICD-10-CM

## 2019-01-02 LAB
ALBUMIN SERPL-MCNC: 3.8 G/DL (ref 3.4–5)
ALBUMIN/GLOB SERPL: 1.3 {RATIO} (ref 0.8–1.7)
ALP SERPL-CCNC: 73 U/L (ref 45–117)
ALT SERPL-CCNC: 35 U/L (ref 13–56)
ANION GAP SERPL CALC-SCNC: 7 MMOL/L (ref 3–18)
AST SERPL-CCNC: 19 U/L (ref 15–37)
BILIRUB SERPL-MCNC: 0.7 MG/DL (ref 0.2–1)
BUN SERPL-MCNC: 11 MG/DL (ref 7–18)
BUN/CREAT SERPL: 14 (ref 12–20)
CALCIUM SERPL-MCNC: 8.6 MG/DL (ref 8.5–10.1)
CHLORIDE SERPL-SCNC: 108 MMOL/L (ref 100–108)
CO2 SERPL-SCNC: 32 MMOL/L (ref 21–32)
CREAT SERPL-MCNC: 0.77 MG/DL (ref 0.6–1.3)
ERYTHROCYTE [DISTWIDTH] IN BLOOD BY AUTOMATED COUNT: 13.9 % (ref 11.6–14.5)
GLOBULIN SER CALC-MCNC: 2.9 G/DL (ref 2–4)
GLUCOSE SERPL-MCNC: 112 MG/DL (ref 74–99)
HCT VFR BLD AUTO: 40 % (ref 35–45)
HGB BLD-MCNC: 12.7 G/DL (ref 12–16)
MCH RBC QN AUTO: 30.2 PG (ref 24–34)
MCHC RBC AUTO-ENTMCNC: 31.8 G/DL (ref 31–37)
MCV RBC AUTO: 95.2 FL (ref 74–97)
PLATELET # BLD AUTO: 218 K/UL (ref 135–420)
PMV BLD AUTO: 11.1 FL (ref 9.2–11.8)
POTASSIUM SERPL-SCNC: 3.1 MMOL/L (ref 3.5–5.5)
PROT SERPL-MCNC: 6.7 G/DL (ref 6.4–8.2)
RBC # BLD AUTO: 4.2 M/UL (ref 4.2–5.3)
SODIUM SERPL-SCNC: 147 MMOL/L (ref 136–145)
WBC # BLD AUTO: 4.3 K/UL (ref 4.6–13.2)

## 2019-01-02 PROCEDURE — 36415 COLL VENOUS BLD VENIPUNCTURE: CPT

## 2019-01-02 PROCEDURE — 85027 COMPLETE CBC AUTOMATED: CPT

## 2019-01-02 PROCEDURE — 80053 COMPREHEN METABOLIC PANEL: CPT

## 2019-01-02 NOTE — PROGRESS NOTES
Patient is in the office today for pre-op    1. Have you been to the ER, urgent care clinic since your last visit? Hospitalized since your last visit? No    2. Have you seen or consulted any other health care providers outside of the [address] [place] [address] since your last visit? Include any pap smears or colon screening.  No

## 2019-01-02 NOTE — PROGRESS NOTES
Preoperative Evaluation    Date of Exam: 2019    Natan Mejía is a 62 y.o. female (:1960) who presents for preoperative evaluation. Procedure/Surgery: Excision of soft tissue mass left third digit  Date of Procedure/Surgery: 19  Surgeon: Dr Elsi Jean: [address] [place] [address]    Primary Physician: Beryl Chavez MD      HPI: Patient presents for medical clearance for foot surgery for excision of soft tissue mass on her left third digit. Patient is in good health except for her blood pressure which is elevated today. It has been elevated recently and patient has been attributing it to being under a lot of stress going through recent divorce as well as having a headache today. Patient's blood pressure is on the high side so we will give her 1 week to try and decrease stress if possible. If blood pressures not improving will need to start an antihypertensive before surgery. She is medically cleared for surgery above once blood pressure is better controlled. Medical History:     Past Medical History:   Diagnosis Date    Anxiety 10/20/2010    Headache     Hypercholesterolemia     Hypertension      Allergies: Allergies   Allergen Reactions    Vibramycin [Doxycycline Calcium] Nausea Only      Medications:     Current Outpatient Medications   Medication Sig    multivitamin with iron-mineral 0.8 mg Cmpk Take  by mouth.  cetirizine (ZYRTEC) 10 mg tablet Take 1 Tab by mouth daily.  ondansetron (ZOFRAN ODT) 4 mg disintegrating tablet Take 1 Tab by mouth every eight (8) hours as needed for Nausea.  clindamycin-benzoyl peroxide (BENZACLIN) 1-5 % topical gel Apply  to affected area two (2) times a day. Apply to affected area after the skin has been cleansed and dried.  fluticasone (FLONASE) 50 mcg/actuation nasal spray 2 Sprays by Both Nostrils route daily. No current facility-administered medications for this visit. Surgical History:     Past Surgical History:   Procedure Laterality Date    HX GYN      etopic pregnancy     Social History:     Social History     Socioeconomic History    Marital status:      Spouse name: Not on file    Number of children: Not on file    Years of education: Not on file    Highest education level: Not on file   Tobacco Use    Smoking status: Never Smoker    Smokeless tobacco: Never Used   Substance and Sexual Activity    Alcohol use: No    Drug use: No    Sexual activity: Yes     Partners: Male       Recent use of: No recent use of aspirin (ASA), NSAIDS or steroids    Anesthesia Complications: None  History of abnormal bleeding : None      REVIEW OF SYSTEMS:  Respiratory: negative for dyspnea on exertion  Cardiovascular: negative for chest pain    EXAM:   Visit Vitals  BP (!) 178/96 (BP 1 Location: Left arm, BP Patient Position: Sitting)   Pulse 78   Temp 97.5 °F (36.4 °C) (Oral)   Resp 20   Ht 5' 6\" (1.676 m)   Wt 161 lb (73 kg)   LMP 05/03/2016   SpO2 98%   BMI 25.99 kg/m²     Eyes - pupils equal and reactive, extraocular eye movements intact  Mouth - mucous membranes moist, pharynx normal without lesions  Neck - supple, no significant adenopathy, carotids upstroke normal bilaterally, no bruits, thyroid exam: thyroid is normal in size without nodules or tenderness  Chest - clear to auscultation, no wheezes, rales or rhonchi, symmetric air entry  Heart - normal rate, regular rhythm, normal S1, S2, no murmurs, rubs, clicks or gallops  Extremities - peripheral pulses normal, no pedal edema, no clubbing or cyanosis      DIAGNOSTICS:   1. Labs:   Lab Results   Component Value Date/Time    WBC 4.3 (L) 01/02/2019 12:47 PM    HGB 12.7 01/02/2019 12:47 PM    HCT 40.0 01/02/2019 12:47 PM    PLATELET 547 85/16/5486 12:47 PM    MCV 95.2 01/02/2019 12:47 PM     Lab Results   Component Value Date/Time    Sodium 147 (H) 01/02/2019 12:47 PM    Potassium 3.1 (L) 01/02/2019 12:47 PM Chloride 108 01/02/2019 12:47 PM    CO2 32 01/02/2019 12:47 PM    Anion gap 7 01/02/2019 12:47 PM    Glucose 112 (H) 01/02/2019 12:47 PM    BUN 11 01/02/2019 12:47 PM    Creatinine 0.77 01/02/2019 12:47 PM    BUN/Creatinine ratio 14 01/02/2019 12:47 PM    GFR est AA >60 01/02/2019 12:47 PM    GFR est non-AA >60 01/02/2019 12:47 PM    Calcium 8.6 01/02/2019 12:47 PM        IMPRESSION:           ICD-10-CM ICD-9-CM    1. Essential hypertension I10 401.9  uncontrolled. We will recheck blood pressure in 1 week if not improved will need to start on antihypertensive medications before surgery   2. Hypokalemia E87.6 276.8  will replace with oral potassium   3. Mass of joint of left foot M25.872 719.67  patient medically cleared for surgery above once blood pressure controlled and low potassium replaced. Follow-up Disposition:  Return in about 1 week (around 1/9/2019) for BP check.     Tsering Joya MD   1/2/2019

## 2019-01-02 NOTE — PATIENT INSTRUCTIONS
How to Prepare for Surgery  How do you prepare for surgery? Surgery can be stressful. This information will help you understand what you can expect. And it will help you safely prepare for surgery. Follow-up care is a key part of your treatment and safety. Be sure to make and go to all appointments, and call your doctor if you are having problems. It's also a good idea to know your test results and keep a list of the medicines you take. What happens before surgery?   Preparing for surgery    · Understand exactly what surgery is planned, along with the risks, benefits, and other options. · Tell your doctors ALL the medicines, vitamins, supplements, and herbal remedies you take. Some of these can increase the risk of bleeding or interact with anesthesia.     · If you take blood thinners, such as warfarin (Coumadin), clopidogrel (Plavix), or aspirin, be sure to talk to your doctor. He or she will tell you if you should stop taking these medicines before your surgery. Make sure that you understand exactly what your doctor wants you to do.     · Your doctor will tell you which medicines to take or stop before your surgery. You may need to stop taking certain medicines a week or more before surgery. So talk to your doctor as soon as you can.     · If you have an advance directive, let your doctor know. It may include a living will and a durable power of  for health care. Bring a copy to the hospital. If don't have one, you may want to prepare one. It lets your doctor and loved ones know your health care wishes. Doctors advise that everyone prepare these papers before any type of surgery or procedure. What happens on the day of surgery?    · Follow the instructions about when to stop eating and drinking. If you don't, your surgery may be canceled.  If your doctor told you to take your medicines on the day of surgery, take them with only a sip of water.     · Take a bath or shower before coming in for your surgery. Do not apply lotions, perfumes, deodorants, or nail polish.     · Do not shave the surgical site yourself.     · Take off all jewelry and piercings. And take out contact lenses, if you wear them.    At the hospital or surgery center   · Bring a picture ID.     · The area for surgery is often marked to make sure there are no errors.     · You will be kept comfortable and safe by your anesthesia provider. The anesthesia may make you sleep. Or it may just numb the area being worked on. Going home   · Be sure you have someone to drive you home. Anesthesia and pain medicine make it unsafe for you to drive.     · You will be given more specific instructions about recovering from your surgery. They will cover things like diet, wound care, follow-up care, driving, and getting back to your normal routine. When should you call your doctor? · You have questions or concerns.     · You don't understand how to prepare for your surgery.     · You become ill before the surgery (such as fever, flu, or a cold).     · You need to reschedule or have changed your mind about having the surgery. Where can you learn more? Go to http://matt-hannah.info/. Enter Q270 in the search box to learn more about \"How to Prepare for Surgery. \"  Current as of: March 29, 2018  Content Version: 11.8  © 1210-6207 [employer]wise, Incorporated. Care instructions adapted under license by [employer] (which disclaims liability or warranty for this information). If you have questions about a medical condition or this instruction, always ask your healthcare professional. [place] [address] any warranty or liability for your use of this information.

## 2019-01-09 ENCOUNTER — HOSPITAL ENCOUNTER (OUTPATIENT)
Dept: LAB | Age: 59
Discharge: HOME | End: 2019-01-09
Payer: COMMERCIAL

## 2019-01-09 ENCOUNTER — OFFICE VISIT (OUTPATIENT)
Dept: FAMILY MEDICINE CLINIC | Age: 59
End: 2019-01-09

## 2019-01-09 VITALS
TEMPERATURE: 98.9 F | OXYGEN SATURATION: 98 % | DIASTOLIC BLOOD PRESSURE: 109 MMHG | HEART RATE: 78 BPM | HEIGHT: 66 IN | SYSTOLIC BLOOD PRESSURE: 186 MMHG | BODY MASS INDEX: 25.71 KG/M2 | RESPIRATION RATE: 20 BRPM | WEIGHT: 160 LBS

## 2019-01-09 DIAGNOSIS — E87.6 HYPOKALEMIA: ICD-10-CM

## 2019-01-09 DIAGNOSIS — I10 ESSENTIAL HYPERTENSION: Primary | ICD-10-CM

## 2019-01-09 DIAGNOSIS — J30.1 SEASONAL ALLERGIC RHINITIS DUE TO POLLEN: ICD-10-CM

## 2019-01-09 LAB
ANION GAP SERPL CALC-SCNC: 4 MMOL/L (ref 3–18)
BUN SERPL-MCNC: 13 MG/DL (ref 7–18)
BUN/CREAT SERPL: 18 (ref 12–20)
CALCIUM SERPL-MCNC: 9.3 MG/DL (ref 8.5–10.1)
CHLORIDE SERPL-SCNC: 106 MMOL/L (ref 100–108)
CO2 SERPL-SCNC: 33 MMOL/L (ref 21–32)
CREAT SERPL-MCNC: 0.71 MG/DL (ref 0.6–1.3)
GLUCOSE SERPL-MCNC: 89 MG/DL (ref 74–99)
POTASSIUM SERPL-SCNC: 3.7 MMOL/L (ref 3.5–5.5)
SODIUM SERPL-SCNC: 143 MMOL/L (ref 136–145)

## 2019-01-09 PROCEDURE — 80048 BASIC METABOLIC PNL TOTAL CA: CPT

## 2019-01-09 PROCEDURE — 36415 COLL VENOUS BLD VENIPUNCTURE: CPT

## 2019-01-09 RX ORDER — FLUTICASONE PROPIONATE 50 MCG
2 SPRAY, SUSPENSION (ML) NASAL DAILY
Qty: 1 BOTTLE | Refills: 2 | Status: SHIPPED | OUTPATIENT
Start: 2019-01-09

## 2019-01-09 RX ORDER — MONTELUKAST SODIUM 10 MG/1
10 TABLET ORAL DAILY
Qty: 30 TAB | Refills: 5 | Status: SHIPPED | OUTPATIENT
Start: 2019-01-09

## 2019-01-09 RX ORDER — OLMESARTAN MEDOXOMIL 40 MG/1
40 TABLET ORAL DAILY
Qty: 30 TAB | Refills: 4 | Status: SHIPPED | OUTPATIENT
Start: 2019-01-09 | End: 2019-02-22 | Stop reason: ALTCHOICE

## 2019-01-09 NOTE — PATIENT INSTRUCTIONS
High Blood Pressure: Care Instructions  Your Care Instructions    If your blood pressure is usually above 130/80, you have high blood pressure, or hypertension. That means the top number is 130 or higher or the bottom number is 80 or higher, or both. Despite what a lot of people think, high blood pressure usually doesn't cause headaches or make you feel dizzy or lightheaded. It usually has no symptoms. But it does increase your risk for heart attack, stroke, and kidney or eye damage. The higher your blood pressure, the more your risk increases. Your doctor will give you a goal for your blood pressure. Your goal will be based on your health and your age. Lifestyle changes, such as eating healthy and being active, are always important to help lower blood pressure. You might also take medicine to reach your blood pressure goal.  Follow-up care is a key part of your treatment and safety. Be sure to make and go to all appointments, and call your doctor if you are having problems. It's also a good idea to know your test results and keep a list of the medicines you take. How can you care for yourself at home? Medical treatment  · If you stop taking your medicine, your blood pressure will go back up. You may take one or more types of medicine to lower your blood pressure. Be safe with medicines. Take your medicine exactly as prescribed. Call your doctor if you think you are having a problem with your medicine. · Talk to your doctor before you start taking aspirin every day. Aspirin can help certain people lower their risk of a heart attack or stroke. But taking aspirin isn't right for everyone, because it can cause serious bleeding. · See your doctor regularly. You may need to see the doctor more often at first or until your blood pressure comes down. · If you are taking blood pressure medicine, talk to your doctor before you take decongestants or anti-inflammatory medicine, such as ibuprofen.  Some of these medicines can raise blood pressure. · Learn how to check your blood pressure at home. Lifestyle changes  · Stay at a healthy weight. This is especially important if you put on weight around the waist. Losing even 10 pounds can help you lower your blood pressure. · If your doctor recommends it, get more exercise. Walking is a good choice. Bit by bit, increase the amount you walk every day. Try for at least 30 minutes on most days of the week. You also may want to swim, bike, or do other activities. · Avoid or limit alcohol. Talk to your doctor about whether you can drink any alcohol. · Try to limit how much sodium you eat to less than 2,300 milligrams (mg) a day. Your doctor may ask you to try to eat less than 1,500 mg a day. · Eat plenty of fruits (such as bananas and oranges), vegetables, legumes, whole grains, and low-fat dairy products. · Lower the amount of saturated fat in your diet. Saturated fat is found in animal products such as milk, cheese, and meat. Limiting these foods may help you lose weight and also lower your risk for heart disease. · Do not smoke. Smoking increases your risk for heart attack and stroke. If you need help quitting, talk to your doctor about stop-smoking programs and medicines. These can increase your chances of quitting for good. When should you call for help? Call 911 anytime you think you may need emergency care. This may mean having symptoms that suggest that your blood pressure is causing a serious heart or blood vessel problem. Your blood pressure may be over 180/120.   For example, call 911 if:    · You have symptoms of a heart attack. These may include:  ? Chest pain or pressure, or a strange feeling in the chest.  ? Sweating. ? Shortness of breath. ? Nausea or vomiting. ? Pain, pressure, or a strange feeling in the back, neck, jaw, or upper belly or in one or both shoulders or arms. ? Lightheadedness or sudden weakness.   ? A fast or irregular heartbeat.     · You have symptoms of a stroke. These may include:  ? Sudden numbness, tingling, weakness, or loss of movement in your face, arm, or leg, especially on only one side of your body. ? Sudden vision changes. ? Sudden trouble speaking. ? Sudden confusion or trouble understanding simple statements. ? Sudden problems with walking or balance. ? A sudden, severe headache that is different from past headaches.     · You have severe back or belly pain.    Do not wait until your blood pressure comes down on its own. Get help right away.   Call your doctor now or seek immediate care if:    · Your blood pressure is much higher than normal (such as 180/120 or higher), but you don't have symptoms.     · You think high blood pressure is causing symptoms, such as:  ? Severe headache.  ? Blurry vision.    Watch closely for changes in your health, and be sure to contact your doctor if:    · Your blood pressure measures higher than your doctor recommends at least 2 times. That means the top number is higher or the bottom number is higher, or both.     · You think you may be having side effects from your blood pressure medicine. Where can you learn more? Go to http://matt-hannah.info/. Enter C430 in the search box to learn more about \"High Blood Pressure: Care Instructions. \"  Current as of: December 6, 2017  Content Version: 11.8  © 2255-5864 Healthwise, Incorporated. Care instructions adapted under license by [employer] (which disclaims liability or warranty for this information). If you have questions about a medical condition or this instruction, always ask your healthcare professional. [place] [address] any warranty or liability for your use of this information.

## 2019-01-09 NOTE — LETTER
NOTIFICATION RETURN TO WORK / SCHOOL 
 
1/9/2019 11:19 AM 
 
Ms. Katie Gold [address] [facility] [facility] [address] To Whom It May Concern: 
 
Katie Gold is currently under the care of Andrea Renner Dr. She will return to work/school on: 1/10/2019. If there are questions or concerns please have the patient contact our office. Sincerely, Phillip Vega MD

## 2019-01-09 NOTE — PROGRESS NOTES
Patient is in the office today for BP follow up. 1. Have you been to the ER, urgent care clinic since your last visit? Hospitalized since your last visit? No    2. Have you seen or consulted any other health care providers outside of the [address] [place] [address] since your last visit? Include any pap smears or colon screening.  No

## 2019-01-12 NOTE — PROGRESS NOTES
Albina Leroy is a 62 y.o.  female and presents with Blood Pressure Check      SUBJECTIVE:  Pt's HTN remains under poor control with just diet. She will need to start on BP meds to try and get it under better control before her foot surgery. Pt's potassium was low 1 week ago. She did not use the Kdur that I called in for her. She was advised to use it and will recheck potassium today to make sure it is not lower. Pt continues to have sinus congestion and headaches. Respiratory ROS: negative for - shortness of breath  Cardiovascular ROS: negative for - chest pain    Current Outpatient Medications   Medication Sig    olmesartan (BENICAR) 40 mg tablet Take 1 Tab by mouth daily.  fluticasone (FLONASE) 50 mcg/actuation nasal spray 2 Sprays by Both Nostrils route daily.  montelukast (SINGULAIR) 10 mg tablet Take 1 Tab by mouth daily.  multivitamin with iron-mineral 0.8 mg Cmpk Take  by mouth.  potassium chloride (K-DUR, KLOR-CON) 20 mEq tablet Take 1 Tab by mouth daily.  cetirizine (ZYRTEC) 10 mg tablet Take 1 Tab by mouth daily.  ondansetron (ZOFRAN ODT) 4 mg disintegrating tablet Take 1 Tab by mouth every eight (8) hours as needed for Nausea.  clindamycin-benzoyl peroxide (BENZACLIN) 1-5 % topical gel Apply  to affected area two (2) times a day. Apply to affected area after the skin has been cleansed and dried. No current facility-administered medications for this visit.           OBJECTIVE:  alert, well appearing, and in no distress  Visit Vitals  BP (!) 186/109 (BP 1 Location: Left arm, BP Patient Position: Sitting)   Pulse 78   Temp 98.9 °F (37.2 °C) (Oral)   Resp 20   Ht 5' 6\" (1.676 m)   Wt 160 lb (72.6 kg)   LMP 05/03/2016   SpO2 98%   BMI 25.82 kg/m²      well developed and well nourished      Labs:   Lab Results   Component Value Date/Time    Sodium 143 01/09/2019 11:05 AM    Potassium 3.7 01/09/2019 11:05 AM    Chloride 106 01/09/2019 11:05 AM    CO2 33 (H) 01/09/2019 11:05 AM    Anion gap 4 01/09/2019 11:05 AM    Glucose 89 01/09/2019 11:05 AM    BUN 13 01/09/2019 11:05 AM    Creatinine 0.71 01/09/2019 11:05 AM    BUN/Creatinine ratio 18 01/09/2019 11:05 AM    GFR est AA >60 01/09/2019 11:05 AM    GFR est non-AA >60 01/09/2019 11:05 AM    Calcium 9.3 01/09/2019 11:05 AM        Assessment/Plan      ICD-10-CM ICD-9-CM    1. Essential hypertension I10 401.9 Uncontrolled. Will start on olmesartan (BENICAR) 40 mg tablet   2. Hypokalemia E87.6 276.8 Pt to take Kdur 20 meq every day and will check METABOLIC PANEL, BASIC today    3. Seasonal allergic rhinitis due to pollen J30.1 477.0 Will treat with fluticasone (FLONASE) 50 mcg/actuation nasal spray      And montelukast (SINGULAIR) 10 mg tablet     Follow-up Disposition:  Return in about 1 week (around 1/16/2019) for BP check. Reviewed plan of care. Patient has provided input and agrees with goals.

## 2019-01-22 ENCOUNTER — OFFICE VISIT (OUTPATIENT)
Dept: FAMILY MEDICINE CLINIC | Age: 59
End: 2019-01-22

## 2019-01-22 VITALS
HEIGHT: 66 IN | TEMPERATURE: 99.2 F | BODY MASS INDEX: 25.71 KG/M2 | HEART RATE: 71 BPM | SYSTOLIC BLOOD PRESSURE: 135 MMHG | WEIGHT: 160 LBS | OXYGEN SATURATION: 98 % | RESPIRATION RATE: 20 BRPM | DIASTOLIC BLOOD PRESSURE: 80 MMHG

## 2019-01-22 DIAGNOSIS — R22.42 FOOT MASS, LEFT: ICD-10-CM

## 2019-01-22 DIAGNOSIS — E87.6 HYPOKALEMIA: ICD-10-CM

## 2019-01-22 DIAGNOSIS — J30.1 SEASONAL ALLERGIC RHINITIS DUE TO POLLEN: ICD-10-CM

## 2019-01-22 DIAGNOSIS — I10 ESSENTIAL HYPERTENSION: Primary | ICD-10-CM

## 2019-01-22 NOTE — PATIENT INSTRUCTIONS
High Blood Pressure: Care Instructions  Overview    It's normal for blood pressure to go up and down throughout the day. But if it stays up, you have high blood pressure. Another name for high blood pressure is hypertension. Despite what a lot of people think, high blood pressure usually doesn't cause headaches or make you feel dizzy or lightheaded. It usually has no symptoms. But it does increase your risk of stroke, heart attack, and other problems. You and your doctor will talk about your risks of these problems based on your blood pressure. Your doctor will give you a goal for your blood pressure. Your goal will be based on your health and your age. Lifestyle changes, such as eating healthy and being active, are always important to help lower blood pressure. You might also take medicine to reach your blood pressure goal.  Follow-up care is a key part of your treatment and safety. Be sure to make and go to all appointments, and call your doctor if you are having problems. It's also a good idea to know your test results and keep a list of the medicines you take. How can you care for yourself at home? Medical treatment  · If you stop taking your medicine, your blood pressure will go back up. You may take one or more types of medicine to lower your blood pressure. Be safe with medicines. Take your medicine exactly as prescribed. Call your doctor if you think you are having a problem with your medicine. · Talk to your doctor before you start taking aspirin every day. Aspirin can help certain people lower their risk of a heart attack or stroke. But taking aspirin isn't right for everyone, because it can cause serious bleeding. · See your doctor regularly. You may need to see the doctor more often at first or until your blood pressure comes down. · If you are taking blood pressure medicine, talk to your doctor before you take decongestants or anti-inflammatory medicine, such as ibuprofen.  Some of these medicines can raise blood pressure. · Learn how to check your blood pressure at home. Lifestyle changes  · Stay at a healthy weight. This is especially important if you put on weight around the waist. Losing even 10 pounds can help you lower your blood pressure. · If your doctor recommends it, get more exercise. Walking is a good choice. Bit by bit, increase the amount you walk every day. Try for at least 30 minutes on most days of the week. You also may want to swim, bike, or do other activities. · Avoid or limit alcohol. Talk to your doctor about whether you can drink any alcohol. · Try to limit how much sodium you eat to less than 2,300 milligrams (mg) a day. Your doctor may ask you to try to eat less than 1,500 mg a day. · Eat plenty of fruits (such as bananas and oranges), vegetables, legumes, whole grains, and low-fat dairy products. · Lower the amount of saturated fat in your diet. Saturated fat is found in animal products such as milk, cheese, and meat. Limiting these foods may help you lose weight and also lower your risk for heart disease. · Do not smoke. Smoking increases your risk for heart attack and stroke. If you need help quitting, talk to your doctor about stop-smoking programs and medicines. These can increase your chances of quitting for good. When should you call for help? Call 911 anytime you think you may need emergency care. This may mean having symptoms that suggest that your blood pressure is causing a serious heart or blood vessel problem. Your blood pressure may be over 180/120.   For example, call 911 if:    · You have symptoms of a heart attack. These may include:  ? Chest pain or pressure, or a strange feeling in the chest.  ? Sweating. ? Shortness of breath. ? Nausea or vomiting. ? Pain, pressure, or a strange feeling in the back, neck, jaw, or upper belly or in one or both shoulders or arms. ? Lightheadedness or sudden weakness.   ? A fast or irregular heartbeat.     · You have symptoms of a stroke. These may include:  ? Sudden numbness, tingling, weakness, or loss of movement in your face, arm, or leg, especially on only one side of your body. ? Sudden vision changes. ? Sudden trouble speaking. ? Sudden confusion or trouble understanding simple statements. ? Sudden problems with walking or balance. ? A sudden, severe headache that is different from past headaches.     · You have severe back or belly pain.    Do not wait until your blood pressure comes down on its own. Get help right away.   Call your doctor now or seek immediate care if:    · Your blood pressure is much higher than normal (such as 180/120 or higher), but you don't have symptoms.     · You think high blood pressure is causing symptoms, such as:  ? Severe headache.  ? Blurry vision.    Watch closely for changes in your health, and be sure to contact your doctor if:    · Your blood pressure measures higher than your doctor recommends at least 2 times. That means the top number is higher or the bottom number is higher, or both.     · You think you may be having side effects from your blood pressure medicine. Where can you learn more? Go to http://matt-hannah.info/. Enter T677 in the search box to learn more about \"High Blood Pressure: Care Instructions. \"  Current as of: July 22, 2018  Content Version: 11.9  © 9724-2965 [employer], Incorporated. Care instructions adapted under license by [employer] (which disclaims liability or warranty for this information). If you have questions about a medical condition or this instruction, always ask your healthcare professional. [place] [address] any warranty or liability for your use of this information. High Blood Pressure: Care Instructions  Overview    It's normal for blood pressure to go up and down throughout the day. But if it stays up, you have high blood pressure.  Another name for high blood pressure is hypertension. Despite what a lot of people think, high blood pressure usually doesn't cause headaches or make you feel dizzy or lightheaded. It usually has no symptoms. But it does increase your risk of stroke, heart attack, and other problems. You and your doctor will talk about your risks of these problems based on your blood pressure. Your doctor will give you a goal for your blood pressure. Your goal will be based on your health and your age. Lifestyle changes, such as eating healthy and being active, are always important to help lower blood pressure. You might also take medicine to reach your blood pressure goal.  Follow-up care is a key part of your treatment and safety. Be sure to make and go to all appointments, and call your doctor if you are having problems. It's also a good idea to know your test results and keep a list of the medicines you take. How can you care for yourself at home? Medical treatment  · If you stop taking your medicine, your blood pressure will go back up. You may take one or more types of medicine to lower your blood pressure. Be safe with medicines. Take your medicine exactly as prescribed. Call your doctor if you think you are having a problem with your medicine. · Talk to your doctor before you start taking aspirin every day. Aspirin can help certain people lower their risk of a heart attack or stroke. But taking aspirin isn't right for everyone, because it can cause serious bleeding. · See your doctor regularly. You may need to see the doctor more often at first or until your blood pressure comes down. · If you are taking blood pressure medicine, talk to your doctor before you take decongestants or anti-inflammatory medicine, such as ibuprofen. Some of these medicines can raise blood pressure. · Learn how to check your blood pressure at home. Lifestyle changes  · Stay at a healthy weight.  This is especially important if you put on weight around the waist. Losing even 10 pounds can help you lower your blood pressure. · If your doctor recommends it, get more exercise. Walking is a good choice. Bit by bit, increase the amount you walk every day. Try for at least 30 minutes on most days of the week. You also may want to swim, bike, or do other activities. · Avoid or limit alcohol. Talk to your doctor about whether you can drink any alcohol. · Try to limit how much sodium you eat to less than 2,300 milligrams (mg) a day. Your doctor may ask you to try to eat less than 1,500 mg a day. · Eat plenty of fruits (such as bananas and oranges), vegetables, legumes, whole grains, and low-fat dairy products. · Lower the amount of saturated fat in your diet. Saturated fat is found in animal products such as milk, cheese, and meat. Limiting these foods may help you lose weight and also lower your risk for heart disease. · Do not smoke. Smoking increases your risk for heart attack and stroke. If you need help quitting, talk to your doctor about stop-smoking programs and medicines. These can increase your chances of quitting for good. When should you call for help? Call 911 anytime you think you may need emergency care. This may mean having symptoms that suggest that your blood pressure is causing a serious heart or blood vessel problem. Your blood pressure may be over 180/120.   For example, call 911 if:    · You have symptoms of a heart attack. These may include:  ? Chest pain or pressure, or a strange feeling in the chest.  ? Sweating. ? Shortness of breath. ? Nausea or vomiting. ? Pain, pressure, or a strange feeling in the back, neck, jaw, or upper belly or in one or both shoulders or arms. ? Lightheadedness or sudden weakness. ? A fast or irregular heartbeat.     · You have symptoms of a stroke. These may include:  ? Sudden numbness, tingling, weakness, or loss of movement in your face, arm, or leg, especially on only one side of your body. ? Sudden vision changes.   ? Sudden trouble speaking. ? Sudden confusion or trouble understanding simple statements. ? Sudden problems with walking or balance. ? A sudden, severe headache that is different from past headaches.     · You have severe back or belly pain.    Do not wait until your blood pressure comes down on its own. Get help right away.   Call your doctor now or seek immediate care if:    · Your blood pressure is much higher than normal (such as 180/120 or higher), but you don't have symptoms.     · You think high blood pressure is causing symptoms, such as:  ? Severe headache.  ? Blurry vision.    Watch closely for changes in your health, and be sure to contact your doctor if:    · Your blood pressure measures higher than your doctor recommends at least 2 times. That means the top number is higher or the bottom number is higher, or both.     · You think you may be having side effects from your blood pressure medicine. Where can you learn more? Go to http://matt-hannah.info/. Enter K207 in the search box to learn more about \"High Blood Pressure: Care Instructions. \"  Current as of: July 22, 2018  Content Version: 11.9  © 9419-8875 [employer]. Care instructions adapted under license by [employer] (which disclaims liability or warranty for this information). If you have questions about a medical condition or this instruction, always ask your healthcare professional. [place] [address] any warranty or liability for your use of this information.

## 2019-01-22 NOTE — LETTER
NOTIFICATION RETURN TO WORK / SCHOOL 
 
1/22/2019 4:58 PM 
 
Ms. Danny Dos Santos [address] [facility] [facility] [address] To Whom It May Concern: 
 
Danny Dos Santos is currently under the care of Andrea [address]. She will return to work/school on: 1/23/2019. If there are questions or concerns please have the patient contact our office. Sincerely, Jonathan Kraft MD

## 2019-01-22 NOTE — PROGRESS NOTES
Patient is in the office today for BP follow up. 1. Have you been to the ER, urgent care clinic since your last visit? Hospitalized since your last visit? No    2. Have you seen or consulted any other health care providers outside of the [address] [place] [address] since your last visit? Include any pap smears or colon screening.  No

## 2019-01-23 NOTE — PROGRESS NOTES
Van Lobato is a 62 y.o.  female and presents with Blood Pressure Check; Allergic Rhinitis; and Mass      SUBJECTIVE:  Pt's HTN is better controlled on Benicar 40 mg daily. Her hypokalemia has resolved with Potassium supplementation eating more foods with potassium. Pt continues to have sinus congestion and sius headaches in AM. Pt to use Singulair and Flonase which she has been prescribe in the past. If morning headaches not improve will need to be evaluated for sleep apnea. Respiratory ROS: negative for - shortness of breath  Cardiovascular ROS: negative for - chest pain    Current Outpatient Medications   Medication Sig    olmesartan (BENICAR) 40 mg tablet Take 1 Tab by mouth daily.  montelukast (SINGULAIR) 10 mg tablet Take 1 Tab by mouth daily.  cetirizine (ZYRTEC) 10 mg tablet Take 1 Tab by mouth daily.  clindamycin-benzoyl peroxide (BENZACLIN) 1-5 % topical gel Apply  to affected area two (2) times a day. Apply to affected area after the skin has been cleansed and dried.  multivitamin with iron-mineral 0.8 mg Cmpk Take  by mouth.  fluticasone (FLONASE) 50 mcg/actuation nasal spray 2 Sprays by Both Nostrils route daily.  potassium chloride (K-DUR, KLOR-CON) 20 mEq tablet Take 1 Tab by mouth daily.  ondansetron (ZOFRAN ODT) 4 mg disintegrating tablet Take 1 Tab by mouth every eight (8) hours as needed for Nausea. No current facility-administered medications for this visit.           OBJECTIVE:  alert, well appearing, and in no distress  Visit Vitals  /80   Pulse 71   Temp 99.2 °F (37.3 °C) (Oral)   Resp 20   Ht 5' 6\" (1.676 m)   Wt 160 lb (72.6 kg)   LMP 05/03/2016   SpO2 98%   BMI 25.82 kg/m²      well developed and well nourished          Labs:   Lab Results   Component Value Date/Time    WBC 4.3 (L) 01/02/2019 12:47 PM    HGB 12.7 01/02/2019 12:47 PM    HCT 40.0 01/02/2019 12:47 PM    PLATELET 875 97/32/8889 12:47 PM    MCV 95.2 01/02/2019 12:47 PM     Lab Results Component Value Date/Time    Sodium 143 01/09/2019 11:05 AM    Potassium 3.7 01/09/2019 11:05 AM    Chloride 106 01/09/2019 11:05 AM    CO2 33 (H) 01/09/2019 11:05 AM    Anion gap 4 01/09/2019 11:05 AM    Glucose 89 01/09/2019 11:05 AM    BUN 13 01/09/2019 11:05 AM    Creatinine 0.71 01/09/2019 11:05 AM    BUN/Creatinine ratio 18 01/09/2019 11:05 AM    GFR est AA >60 01/09/2019 11:05 AM    GFR est non-AA >60 01/09/2019 11:05 AM    Calcium 9.3 01/09/2019 11:05 AM    Bilirubin, total 0.7 01/02/2019 12:47 PM    ALT (SGPT) 35 01/02/2019 12:47 PM    AST (SGOT) 19 01/02/2019 12:47 PM    Alk. phosphatase 73 01/02/2019 12:47 PM    Protein, total 6.7 01/02/2019 12:47 PM    Albumin 3.8 01/02/2019 12:47 PM    Globulin 2.9 01/02/2019 12:47 PM    A-G Ratio 1.3 01/02/2019 12:47 PM           Assessment/Plan      ICD-10-CM ICD-9-CM    1. Essential hypertension I10 401.9 Well controlled on Benicar 40 mg. Pt to take tab AM of surgery with sip of water. 2. Hypokalemia E87.6 276.8 Resolved on Potassium supplementation    3. Seasonal allergic rhinitis due to pollen J30.1 477.0 Pt to use Singulair and Flonase. If morning Headaches do not improve will need evaluation for sleep study. 4. Foot mass, left R22.42 782. 2 Pt medically cleared for foot surgery. Follow-up Disposition:  Return in about 4 weeks (around 2/19/2019) for BP check. Reviewed plan of care. Patient has provided input and agrees with goals.

## 2019-01-30 ENCOUNTER — HOSPITAL ENCOUNTER (OUTPATIENT)
Dept: LAB | Age: 59
Discharge: HOME | End: 2019-01-30
Payer: COMMERCIAL

## 2019-01-30 PROCEDURE — 88307 TISSUE EXAM BY PATHOLOGIST: CPT

## 2019-02-22 ENCOUNTER — TELEPHONE (OUTPATIENT)
Dept: FAMILY MEDICINE CLINIC | Age: 59
End: 2019-02-22

## 2019-02-22 RX ORDER — TELMISARTAN 80 MG/1
80 TABLET ORAL DAILY
Qty: 30 TAB | Refills: 5 | Status: SHIPPED | OUTPATIENT
Start: 2019-02-22 | End: 2019-07-10 | Stop reason: SDUPTHER

## 2019-07-10 ENCOUNTER — OFFICE VISIT (OUTPATIENT)
Dept: FAMILY MEDICINE CLINIC | Age: 59
End: 2019-07-10

## 2019-07-10 VITALS
OXYGEN SATURATION: 98 % | HEIGHT: 66 IN | SYSTOLIC BLOOD PRESSURE: 160 MMHG | RESPIRATION RATE: 20 BRPM | TEMPERATURE: 98.2 F | BODY MASS INDEX: 25.39 KG/M2 | HEART RATE: 64 BPM | WEIGHT: 158 LBS | DIASTOLIC BLOOD PRESSURE: 94 MMHG

## 2019-07-10 DIAGNOSIS — G43.009 MIGRAINE WITHOUT AURA AND WITHOUT STATUS MIGRAINOSUS, NOT INTRACTABLE: ICD-10-CM

## 2019-07-10 DIAGNOSIS — I10 ESSENTIAL HYPERTENSION: ICD-10-CM

## 2019-07-10 DIAGNOSIS — M54.41 ACUTE RIGHT-SIDED LOW BACK PAIN WITH RIGHT-SIDED SCIATICA: Primary | ICD-10-CM

## 2019-07-10 RX ORDER — IBUPROFEN 800 MG/1
800 TABLET ORAL
Qty: 90 TAB | Refills: 1 | Status: SHIPPED | OUTPATIENT
Start: 2019-07-10

## 2019-07-10 RX ORDER — CYCLOBENZAPRINE HCL 5 MG
5 TABLET ORAL
Qty: 30 TAB | Refills: 1 | Status: SHIPPED | OUTPATIENT
Start: 2019-07-10

## 2019-07-10 RX ORDER — BUTALBITAL, ACETAMINOPHEN AND CAFFEINE 50; 325; 40 MG/1; MG/1; MG/1
1 TABLET ORAL
Qty: 60 TAB | Refills: 0 | Status: SHIPPED | OUTPATIENT
Start: 2019-07-10 | End: 2022-04-21 | Stop reason: SDUPTHER

## 2019-07-10 RX ORDER — TELMISARTAN 80 MG/1
80 TABLET ORAL DAILY
Qty: 90 TAB | Refills: 2 | Status: SHIPPED | OUTPATIENT
Start: 2019-07-10 | End: 2020-06-16 | Stop reason: SDUPTHER

## 2019-07-10 NOTE — LETTER
NOTIFICATION RETURN TO WORK / SCHOOL 
 
7/10/2019 9:56 AM 
 
Ms. Luc Harris [address] [facility] [facility] [address] To Whom It May Concern: 
 
[facility] She will return to work/school on: 7/11/19 If there are questions or concerns please have the patient contact our office. Sincerely, Raymona Sandhoff, MD
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
## 2019-07-10 NOTE — PATIENT INSTRUCTIONS
High Blood Pressure: Care Instructions Overview It's normal for blood pressure to go up and down throughout the day. But if it stays up, you have high blood pressure. Another name for high blood pressure is hypertension. Despite what a lot of people think, high blood pressure usually doesn't cause headaches or make you feel dizzy or lightheaded. It usually has no symptoms. But it does increase your risk of stroke, heart attack, and other problems. You and your doctor will talk about your risks of these problems based on your blood pressure. Your doctor will give you a goal for your blood pressure. Your goal will be based on your health and your age. Lifestyle changes, such as eating healthy and being active, are always important to help lower blood pressure. You might also take medicine to reach your blood pressure goal. 
Follow-up care is a key part of your treatment and safety. Be sure to make and go to all appointments, and call your doctor if you are having problems. It's also a good idea to know your test results and keep a list of the medicines you take. How can you care for yourself at home? Medical treatment · If you stop taking your medicine, your blood pressure will go back up. You may take one or more types of medicine to lower your blood pressure. Be safe with medicines. Take your medicine exactly as prescribed. Call your doctor if you think you are having a problem with your medicine. · Talk to your doctor before you start taking aspirin every day. Aspirin can help certain people lower their risk of a heart attack or stroke. But taking aspirin isn't right for everyone, because it can cause serious bleeding. · See your doctor regularly. You may need to see the doctor more often at first or until your blood pressure comes down. · If you are taking blood pressure medicine, talk to your doctor before you take decongestants or anti-inflammatory medicine, such as ibuprofen. Some of these medicines can raise blood pressure. · Learn how to check your blood pressure at home. Lifestyle changes · Stay at a healthy weight. This is especially important if you put on weight around the waist. Losing even 10 pounds can help you lower your blood pressure. · If your doctor recommends it, get more exercise. Walking is a good choice. Bit by bit, increase the amount you walk every day. Try for at least 30 minutes on most days of the week. You also may want to swim, bike, or do other activities. · Avoid or limit alcohol. Talk to your doctor about whether you can drink any alcohol. · Try to limit how much sodium you eat to less than 2,300 milligrams (mg) a day. Your doctor may ask you to try to eat less than 1,500 mg a day. · Eat plenty of fruits (such as bananas and oranges), vegetables, legumes, whole grains, and low-fat dairy products. · Lower the amount of saturated fat in your diet. Saturated fat is found in animal products such as milk, cheese, and meat. Limiting these foods may help you lose weight and also lower your risk for heart disease. · Do not smoke. Smoking increases your risk for heart attack and stroke. If you need help quitting, talk to your doctor about stop-smoking programs and medicines. These can increase your chances of quitting for good. When should you call for help? Call 911 anytime you think you may need emergency care. This may mean having symptoms that suggest that your blood pressure is causing a serious heart or blood vessel problem. Your blood pressure may be over 180/120. 
 For example, call 911 if: 
  · You have symptoms of a heart attack. These may include: 
? Chest pain or pressure, or a strange feeling in the chest. 
? Sweating. ? Shortness of breath. ? Nausea or vomiting. ? Pain, pressure, or a strange feeling in the back, neck, jaw, or upper belly or in one or both shoulders or arms. ? Lightheadedness or sudden weakness. ? A fast or irregular heartbeat.  
  · You have symptoms of a stroke. These may include: 
? Sudden numbness, tingling, weakness, or loss of movement in your face, arm, or leg, especially on only one side of your body. ? Sudden vision changes. ? Sudden trouble speaking. ? Sudden confusion or trouble understanding simple statements. ? Sudden problems with walking or balance. ? A sudden, severe headache that is different from past headaches.  
  · You have severe back or belly pain.  
 Do not wait until your blood pressure comes down on its own. Get help right away. 
 Call your doctor now or seek immediate care if: 
  · Your blood pressure is much higher than normal (such as 180/120 or higher), but you don't have symptoms.  
  · You think high blood pressure is causing symptoms, such as: 
? Severe headache. 
? Blurry vision.  
 Watch closely for changes in your health, and be sure to contact your doctor if: 
  · Your blood pressure measures higher than your doctor recommends at least 2 times. That means the top number is higher or the bottom number is higher, or both.  
  · You think you may be having side effects from your blood pressure medicine. Where can you learn more? Go to http://matt-hannah.info/. Enter P235 in the search box to learn more about \"High Blood Pressure: Care Instructions. \" Current as of: July 22, 2018 Content Version: 11.9 © 2592-6540 [employer]. Care instructions adapted under license by [employer] (which disclaims liability or warranty for this information). If you have questions about a medical condition or this instruction, always ask your healthcare professional. [place] [address] any warranty or liability for your use of this information.
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
## 2019-07-17 ENCOUNTER — HOSPITAL ENCOUNTER (OUTPATIENT)
Dept: PHYSICAL THERAPY | Age: 59
End: 2019-07-17

## 2019-07-22 ENCOUNTER — HOSPITAL ENCOUNTER (OUTPATIENT)
Dept: PHYSICAL THERAPY | Age: 59
Discharge: HOME | End: 2019-07-22
Payer: COMMERCIAL

## 2019-07-22 PROCEDURE — 97161 PT EVAL LOW COMPLEX 20 MIN: CPT

## 2019-07-22 PROCEDURE — 97110 THERAPEUTIC EXERCISES: CPT

## 2019-07-22 PROCEDURE — 97140 MANUAL THERAPY 1/> REGIONS: CPT

## 2019-07-22 NOTE — PROGRESS NOTES
PT DAILY TREATMENT NOTE - [facility]     Patient Name: Jeanie Gilbert  Date:2019  : 1960  [x]  Patient  Verified  Payor: BLUE CROSS / Plan: Rosalino Stockton 5747 PPO / Product Type: PPO /    In time:3:45  Out time:4:24  Total Treatment Time (min): 39  Visit #: 1 of 10    Medicare/BCBS Only   Total Timed Codes (min):  25 1:1 Treatment Time:  39     Treatment Area: Low back pain [M54.5]    SUBJECTIVE  Pain Level (0-10 scale): 8  Any medication changes, allergies to medications, adverse drug reactions, diagnosis change, or new procedure performed?: [x] No    [] Yes (see summary sheet for update)  Subjective functional status/changes:   [] No changes reported  See POC    OBJECTIVE    14 min [x]Eval                  []Re-Eval     10 min Therapeutic Exercise:  [] See flow sheet : HEP instruction and demonstration   Rationale: increase ROM and increase strength to improve the patients ability to tolerate ADLs    7 min Therapeutic Activity:  []  See flow sheet : pt education regarding anatomy and physiology of the spine and LEs and how it relates to the pt's condition. Rationale: increase ROM and increase strength  to improve the patients ability to tolerate ADLs    8 min Manual Therapy: right LE pull to correct right upslip, MET to correct right posterior and left anterior innominate, MET to correct right sacral torsion (in right s/l), shotgun technique    Rationale: decrease pain, increase ROM and increase tissue extensibility to improve activity tolerance          With   [x] TE   [x] TA   [] neuro   [] other: Patient Education: [x] Review HEP    [] Progressed/Changed HEP based on:   [] positioning   [] body mechanics   [] transfers   [] heat/ice application    [] other:      Other Objective/Functional Measures: See evaluation. Pain Level (0-10 scale) post treatment: 6    ASSESSMENT/Changes in Function: Pt given HEP handout to perform. Pt understood exercises in HEP handout.  Reported improvement in pain post manual interventions. Pt demonstrated decreased AROM, muscle tightness, and impaired pelvic alignment. Improvement in pelvic alignment noted post manual interventions. Cannot rule out possible SI joint involvement secondary to pelvic asymmetry. Pt would benefit from physical therapy to improve the above impairments to help the pt return to performing ADLs, functional and work activities. Patient will continue to benefit from skilled PT services to modify and progress therapeutic interventions, address functional mobility deficits, address ROM deficits, address strength deficits, analyze and address soft tissue restrictions, analyze and cue movement patterns, analyze and modify body mechanics/ergonomics, assess and modify postural abnormalities, address imbalance/dizziness and instruct in home and community integration to attain remaining goals. [x]  See Plan of Care  []  See progress note/recertification  []  See Discharge Summary         Progress towards goals / Updated goals:  Short Term Goals: To be accomplished in 2 weeks:  1. Pt will report compliance and independence to [facility] to help the pt manage their pain and symptoms. Eval: established   Long Term Goals: To be accomplished in 5 weeks:  1. Pt will increase FOTO score to 74 points to improve ability to perform ADLs. Eval: 56 points  2. Pt will demonstrate WNL pelvic alignment for 3 consecutive sessions to improve ability lift objects with more ease. Eval: pelvic asymmetry noted. 3. Pt will increase AROM l/s EXT, B SB, and right rotation to WNL with no increased LBP/right LE pain to improve ability to perform household chores with less pain. Eval: EXT % of WNL. , B SB and right rotation WNL all with increased right LBP  4. Pt will report being able to sit for a half a day at work with no increased pain to improve sitting tolerance needed for work and daily tasks.    Eval: reports having to get up at work frequently secondary to increased pain.      PLAN  [x]  Upgrade activities as tolerated     [x]  Continue plan of care  [x]  Update interventions per flow sheet       []  Discharge due to:_  []  Other:_      Rodolfo Hart PT 7/22/2019  4:40 PM    Future Appointments   Date Time Provider Eliz Meier   7/22/2019  3:30 PM Sukhwinder Ervin, PT [facility]   8/7/2019  8:45 AM Katty Persaud MD [address] [place] [address]
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
## 2019-07-30 ENCOUNTER — HOSPITAL ENCOUNTER (OUTPATIENT)
Dept: PHYSICAL THERAPY | Age: 59
Discharge: HOME | End: 2019-07-30
Payer: COMMERCIAL

## 2019-07-30 PROCEDURE — 97110 THERAPEUTIC EXERCISES: CPT | Performed by: PHYSICAL THERAPIST

## 2019-07-30 PROCEDURE — 97112 NEUROMUSCULAR REEDUCATION: CPT | Performed by: PHYSICAL THERAPIST

## 2019-07-30 PROCEDURE — 97530 THERAPEUTIC ACTIVITIES: CPT | Performed by: PHYSICAL THERAPIST

## 2019-07-30 NOTE — PROGRESS NOTES
PT DAILY TREATMENT NOTE 10-18    Patient Name: Marlene Mcgowan  Date:2019  : 1960  [x]  Patient  Verified  Payor: BLUE MIRANDA / Plan: Rosalino Stockton 5747 PPO / Product Type: PPO /    In time:5:45P  Out time:6:30P  Total Treatment Time (min): 45min  Visit #: 1 of 10    Medicare/BCBS Only   Total Timed Codes (min):  39 1:1 Treatment Time:  39       Treatment Area: Low back pain [M54.5]    SUBJECTIVE  Pain Level (0-10 scale): 2.75 on Stephens Pain Scale  Any medication changes, allergies to medications, adverse drug reactions, diagnosis change, or new procedure performed?: [x] No    [] Yes (see summary sheet for update)  Subjective functional status/changes:   [] No changes reported  Patient is progressing with improved activity tolerance and decreased pain with movement compared to initial visit. States she feels better for a while after doing the exercises, but then as soon as she moves a certain way, it \"comes back. \"    OBJECTIVE    10 min Therapeutic Exercise:  [x] See flow sheet :   Rationale: increase ROM and increase strength to improve the patients ability to A/ROM and decrease pain with movement. 12 min Therapeutic Activity:  [x]  See flow sheet :   Rationale: increase ROM and increase strength  to improve the patients ability to Tolerate basic ADLs and job-related tasks without pain. 17 min Neuromuscular Re-education:  [x]  See flow sheet :   Rationale: improve coordination, improve balance and increase proprioception  to improve the patients ability to perform activities with good form and proprioception with tactile and verbal cuing appropriately.           With   [x] TE   [x] TA   [x] neuro   [] other: Patient Education: [x] Review HEP    [x] Progressed/Changed HEP based on:   [x] positioning   [] body mechanics   [] transfers   [] heat/ice application    [] other:      Other Objective/Functional Measures: prone back extension ROM 10 degs     Pain Level (0-10 scale) post treatment: 1/10    ASSESSMENT/Changes in Function: Patient is progressing steadily, but still has elevated fear avoidance of the pain. Once clarified subjective pain report using Eliezer functional Pain scale, patient reported much lower numbers, \"manageable\" pain and was appropriate for progression with exercise program.    Patient will continue to benefit from skilled PT services to modify and progress therapeutic interventions, address functional mobility deficits, address ROM deficits, address strength deficits, analyze and address soft tissue restrictions, analyze and cue movement patterns, analyze and modify body mechanics/ergonomics and assess and modify postural abnormalities to attain remaining goals. [x]  See Plan of Care  []  See progress note/recertification  []  See Discharge Summary         Progress towards goals / Updated goals:  Short Term Goals: To be accomplished in 2 weeks:  1. Pt will report compliance and independence to [facility] to help the pt manage their pain and symptoms.             Eval: established   Current: Progressing; patient reports compliance 7/30/19  Long Term Goals: To be accomplished in 5 weeks:  1. Pt will increase FOTO score to 74 points to improve ability to perform ADLs. Eval: 56 points  2. Pt will demonstrate WNL pelvic alignment for 3 consecutive sessions to improve ability lift objects with more ease.   Eval: pelvic asymmetry noted.   3. Pt will increase AROM l/s EXT, B SB, and right rotation to WNL with no increased LBP/right LE pain to improve ability to perform household chores with less pain. Eval: EXT % of WNL. , B SB and right rotation WNL all with increased right LBP  4.  Pt will report being able to sit for a half a day at work with no increased pain to improve sitting tolerance needed for work and daily tasks.   Eval: reports having to get up at work frequently secondary to increased pain.     PLAN  [x]  Upgrade activities as tolerated     []  Continue plan of care  []  Update interventions per flow sheet       []  Discharge due to:_  []  Other:_      Francheska Kevin, PT 7/30/2019  6:01 PM    Future Appointments   Date Time Provider Eliz Meier   8/5/2019  6:00 PM [facility]   8/7/2019  8:45 AM Cortney Persaud MD [address] [place] [address]

## 2019-08-07 ENCOUNTER — HOSPITAL ENCOUNTER (OUTPATIENT)
Dept: PHYSICAL THERAPY | Age: 59
Discharge: HOME | End: 2019-08-07
Payer: COMMERCIAL

## 2019-08-07 PROCEDURE — 97110 THERAPEUTIC EXERCISES: CPT | Performed by: PHYSICAL THERAPIST

## 2019-08-07 PROCEDURE — 97530 THERAPEUTIC ACTIVITIES: CPT | Performed by: PHYSICAL THERAPIST

## 2019-08-07 PROCEDURE — 97112 NEUROMUSCULAR REEDUCATION: CPT | Performed by: PHYSICAL THERAPIST
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
## 2019-08-14 ENCOUNTER — HOSPITAL ENCOUNTER (OUTPATIENT)
Dept: PHYSICAL THERAPY | Age: 59
Discharge: HOME | End: 2019-08-14
Payer: COMMERCIAL

## 2019-08-14 PROCEDURE — 97530 THERAPEUTIC ACTIVITIES: CPT | Performed by: PHYSICAL THERAPIST

## 2019-08-14 PROCEDURE — 97110 THERAPEUTIC EXERCISES: CPT | Performed by: PHYSICAL THERAPIST

## 2019-08-14 NOTE — PROGRESS NOTES
PT DAILY TREATMENT NOTE 10-18    Patient Name: Glenn Pino  Date:2019  : 1960  [x]  Patient  Verified  Payor: BLUE MIRANDA / Plan: Rosalino Stockton [id number] PPO / Product Type: PPO /    In time:5:55P  Out time:6:30P  Total Treatment Time (min): 35min  Visit #: 4 of 10    Medicare/BCBS Only   Total Timed Codes (min):  30 1:1 Treatment Time:  30       Treatment Area: Low back pain [M54.5]    SUBJECTIVE  Pain Level (0-10 scale): 2.5/10  Any medication changes, allergies to medications, adverse drug reactions, diagnosis change, or new procedure performed?: [x] No    [] Yes (see summary sheet for update)  Subjective functional status/changes:   [] No changes reported  Patient is progressing towards goals of increased activity - able to go shopping last night with just a little bit of pain/soreness increase after 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 OBJECTIVE    15 min Therapeutic Exercise:  [x] See flow sheet :   Rationale: increase ROM and increase strength to improve the patients ability to A/ROM and decrease pain with movement. 12 min Therapeutic Activity:  [x]  See flow sheet :   Rationale: increase strength and improve coordination  to improve the patients ability to Tolerate basic ADLs and job-related tasks without pain. With   [x] TE   [x] TA   [] neuro   [] other: Patient Education: [x] Review HEP    [x] Progressed/Changed HEP based on:   [x] positioning   [] body mechanics   [] transfers   [] heat/ice application    [] other:      Other Objective/Functional Measures: Lateral shift to the L in standing and supine     Pain Level (0-10 scale) post treatment: 1/10    ASSESSMENT/Changes in Function: Patient is making steady gains towards goals of decreased pain and increased mobility, but continues to need VC and TC for correct/proper body mechanics.  Spent time during session discussion action plan for getting back into regular exercise routine. Patient will continue to benefit from skilled PT services to modify and progress therapeutic interventions, address functional mobility deficits, address ROM deficits, address strength deficits, analyze and address soft tissue restrictions, analyze and modify body mechanics/ergonomics and assess and modify postural abnormalities to attain remaining goals. [x]  See Plan of Care  []  See progress note/recertificatiohhh  n  []  See Discharge Summary         Progress towards goals / Updated goals:  Short Term Goals: To be accomplished in 2 weeks:  1. Pt will report compliance and independence to [facility] to help the pt manage their pain and symptoms.             Eval: established   Current: Progressing; patient reports compliance 7/30/19, 8/7/19, 8/14/19  Long Term Goals: To be accomplished in 5 weeks:  1. Pt will increase FOTO score to 74 points to improve ability to perform ADLs. Eval: 56 points  2. Pt will demonstrate WNL pelvic alignment for 3 consecutive sessions to improve ability lift objects with more ease.   Eval: pelvic asymmetry noted.   Current: Lateral shift to the L noted 8/14/19  3. Pt will increase AROM l/s EXT, B SB, and right rotation to WNL with no increased LBP/right LE pain to improve ability to perform household chores with less pain. Eval: EXT % of WNL. , B SB and right rotation WNL all with increased right LBP  4.  Pt will report being able to sit for a half a day at work with no increased pain to improve sitting tolerance needed for work and daily tasks.   Eval: reports having to get up at work frequently secondary to increased pain.     PLAN  [x]  Upgrade activities as tolerated     []  Continue plan of care  []  Update interventions per flow sheet       []  Discharge due to:_  []  Other:_      Chetan Orantes, PT 8/14/2019  6:04 PM    Future Appointments   Date Time Provider Eliz Meier   8/21/2019  6:00 PM Sameer De Los Santos, PT [facility]   8/23/2019  4:30 PM Hank De La Cruz, PT [facility]   8/28/2019  6:00 PM [facility]   8/30/2019  4:30 PM Brook Mercado[facility]

## 2019-08-21 ENCOUNTER — HOSPITAL ENCOUNTER (OUTPATIENT)
Dept: PHYSICAL THERAPY | Age: 59
Discharge: HOME | End: 2019-08-21
Payer: COMMERCIAL

## 2019-08-21 PROCEDURE — 97112 NEUROMUSCULAR REEDUCATION: CPT | Performed by: PHYSICAL THERAPIST

## 2019-08-21 PROCEDURE — 97110 THERAPEUTIC EXERCISES: CPT | Performed by: PHYSICAL THERAPIST

## 2019-08-21 PROCEDURE — 97530 THERAPEUTIC ACTIVITIES: CPT | Performed by: PHYSICAL THERAPIST
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
## 2019-08-21 NOTE — PROGRESS NOTES
PT DAILY TREATMENT NOTE 10-18    Patient Name: Leonel Eugene  Date:2019  : 1960  [x]  Patient  Verified  Payor: BLUE CROSS / Plan: Rosalino Stockton 5747 PPO / Product Type: PPO /    In time:6:00P  Out time:6:50P  Total Treatment Time (min): 50min  Visit #: 5 of 10    Medicare/BCBS Only   Total Timed Codes (min):  40 1:1 Treatment Time:  30       Treatment Area: Low back pain [M54.5]    SUBJECTIVE  Pain Level (0-10 scale): 0/10  Any medication changes, allergies to medications, adverse drug reactions, diagnosis change, or new procedure performed?: [x] No    [] Yes (see summary sheet for update)  Subjective functional status/changes:   [] No changes reported  Patient states she is progressively getting much better, overall feeling good, able to do more things with less pain. OBJECTIVE    Modality rationale: decrease edema, decrease inflammation and increase tissue extensibility to improve the patients ability to improve activity tolerance.     Min Type Additional Details    [] Estim:  []Unatt       []IFC  []Premod                        []Other:  []w/ice   []w/heat  Position:  Location:    [] Estim: []Att    []TENS instruct  []NMES                    []Other:  []w/US   []w/ice   []w/heat  Position:  Location:    []  Traction: [] Cervical       []Lumbar                       [] Prone          []Supine                       []Intermittent   []Continuous Lbs:  [] before manual  [] after manual    []  Ultrasound: []Continuous   [] Pulsed                           []1MHz   []3MHz W/cm2:  Location:    []  Iontophoresis with dexamethasone         Location: [] Take home patch   [] In clinic   15 []  Ice     [x]  heat  []  Ice massage  []  Laser   []  Anodyne Position: prone  Location: lumbar spine    []  Laser with stim  []  Other:  Position:  Location:    []  Vasopneumatic Device Pressure:       [] lo [] med [] hi   Temperature: [] lo [] med [] hi   [] Skin assessment post-treatment:  []intact []redness- no adverse reaction    []redness  adverse reaction:     15 min Therapeutic Exercise:  [x] See flow sheet :   Rationale: increase ROM and increase strength to improve the patients ability to A/ROM and decrease pain with movement. 25 min Therapeutic Activity:  [x]  See flow sheet :   Rationale: increase strength and improve coordination  to improve the patients ability to Tolerate basic ADLs and job-related tasks without pain. Included Manual therapy to lumbar paraspinals, as well as Discussion of progress/pain science education. With   [x] TE   [x] TA   [x] neuro   [] other: Patient Education: [x] Review HEP    [x] Progressed/Changed HEP based on:   [x] positioning   [x] body mechanics   [] transfers   [] heat/ice application    [] other:      Other Objective/Functional Measures: A/ROM     Pain Level (0-10 scale) post treatment: 1/10    ASSESSMENT/Changes in Function: Patient is progressing towards goals well, limited by fear, and work/patient availability for appointments. Continue to recommend skilled services for pain science education, guided exercise progression. Patient will continue to benefit from skilled PT services to modify and progress therapeutic interventions, address functional mobility deficits, address ROM deficits, address strength deficits, analyze and address soft tissue restrictions, analyze and cue movement patterns, analyze and modify body mechanics/ergonomics and assess and modify postural abnormalities to attain remaining goals. [x]  See Plan of Care  []  See progress note/recertification  []  See Discharge Summary         Progress towards goals / Updated goals:  Short Term Goals: To be accomplished in 2 weeks:  1. Pt will report compliance and independence to [facility] to help the pt manage their pain and symptoms.             Eval: established   Current: Progressing; patient reports compliance 7/30/19, 8/7/19, 8/14/19, 8/21/19  Long Term Goals: To be accomplished in 5 weeks:  1. Pt will increase FOTO score to 74 points to improve ability to perform ADLs. Eval: 56 points  Current:   2. Pt will demonstrate WNL pelvic alignment for 3 consecutive sessions to improve ability lift objects with more ease.   Eval: pelvic asymmetry noted.   Current: Lateral shift to the L noted 8/14/19  3. Pt will increase AROM l/s EXT, B SB, and right rotation to WNL with no increased LBP/right LE pain to improve ability to perform household chores with less pain. Eval: EXT % of WNL. , B SB and right rotation WNL all with increased right LBP  4.  Pt will report being able to sit for a half a day at work with no increased pain to improve sitting tolerance needed for work and daily tasks.   Eval: reports having to get up at work frequently secondary to increased pain.       PLAN  [x]  Upgrade activities as tolerated     []  Continue plan of care  []  Update interventions per flow sheet       []  Discharge due to:_  []  Other:_      Maren Luna, PT 8/21/2019  6:31 PM    Future Appointments   Date Time Provider Eliz Meier   8/23/2019  4:30 PM Carlso Elias, PT [facility]   8/28/2019  6:00 PM Suresh Vincent, PT [facility]   8/30/2019  4:30 PM Luis F Mercado, PT [facility]

## 2019-08-28 ENCOUNTER — HOSPITAL ENCOUNTER (OUTPATIENT)
Dept: PHYSICAL THERAPY | Age: 59
Discharge: HOME | End: 2019-08-28
Payer: COMMERCIAL

## 2019-08-28 PROCEDURE — 97110 THERAPEUTIC EXERCISES: CPT

## 2019-08-28 PROCEDURE — 97140 MANUAL THERAPY 1/> REGIONS: CPT

## 2019-08-28 PROCEDURE — 97530 THERAPEUTIC ACTIVITIES: CPT

## 2019-08-28 PROCEDURE — 97112 NEUROMUSCULAR REEDUCATION: CPT

## 2019-08-28 NOTE — PROGRESS NOTES
PT DAILY TREATMENT NOTE 10-18    Patient Name: Yuval Ramirez  Date:2019  : 1960  [x]  Patient  Verified  Payor: BLUE CROSS / Plan: Rosalino Stockton 5747 PPO / Product Type: PPO /    In time:530  Out time:636  Total Treatment Time (min): 66  Visit #: 6 of 10    Medicare/BCBS Only   Total Timed Codes (min):  56 1:1 Treatment Time:  56       Treatment Area: Low back pain [M54.5]    SUBJECTIVE  Pain Level (0-10 scale): 2  Any medication changes, allergies to medications, adverse drug reactions, diagnosis change, or new procedure performed?: [x] No    [] Yes (see summary sheet for update)  Subjective functional status/changes:   [] No changes reported  \" I am a little tighter today. I think it is related to stress at work. I am feeling it a little more on the right side today. \"    OBJECTIVE    Modality rationale: decrease pain and increase tissue extensibility to improve the patients ability to aid with increase tolerance to ADLs and activities   Min Type Additional Details    [] Estim:  []Unatt       []IFC  []Premod                        []Other:  []w/ice   []w/heat  Position:  Location:    [] Estim: []Att    []TENS instruct  []NMES                    []Other:  []w/US   []w/ice   []w/heat  Position:  Location:    []  Traction: [] Cervical       []Lumbar                       [] Prone          []Supine                       []Intermittent   []Continuous Lbs:  [] before manual  [] after manual    []  Ultrasound: []Continuous   [] Pulsed                           []1MHz   []3MHz W/cm2:  Location:    []  Iontophoresis with dexamethasone         Location: [] Take home patch   [] In clinic   10 []  Ice     [x]  Heat  Post  []  Ice massage  []  Laser   []  Anodyne Position:prone  Location:B LS    []  Laser with stim  []  Other:  Position:  Location:    []  Vasopneumatic Device Pressure:       [] lo [] med [] hi   Temperature: [] lo [] med [] hi   [] Skin assessment post-treatment:  []intact []redness- no adverse reaction    []redness  adverse reaction:       min []Eval                  []Re-Eval       20 min Therapeutic Exercise:  [x] See flow sheet :   Rationale: increase ROM, increase strength and improve coordination to improve the patients ability to aid with increase tolerance to ADLs and activities    18 min Therapeutic Activity:  [x]  See flow sheet :   Rationale: increase ROM, increase strength, improve coordination and improve balance  to improve the patients ability to aid with increase tolerance to ADLs and activities     10 min Neuromuscular Re-education:  [x]  See flow sheet :   Rationale: increase ROM, increase strength, improve coordination and improve balance  to improve the patients ability to aid with increase tolerance to ADLs and activities    8 min Manual Therapy:  LAD leg pull right LE with alignment check   Rationale: decrease pain, increase ROM and increase tissue extensibility to aid with increase tolerance to ADLs and activities     min Gait Training:  ___ feet with ___ device on level surfaces with ___ level of assist   Rationale:           With   [x] TE   [x] TA   [] neuro   [] other: Patient Education: [x] Review HEP    [x] Progressed/Changed HEP based on:   [] positioning   [] body mechanics   [] transfers   [] heat/ice application    [] other:      Other Objective/Functional Measures: VC exercises and technique  Added [place] rows and extensions     Pain Level (0-10 scale) post treatment: 2    ASSESSMENT/Changes in Function: tolerated well    Patient will continue to benefit from skilled PT services to modify and progress therapeutic interventions, address functional mobility deficits, address ROM deficits, address strength deficits, analyze and address soft tissue restrictions, analyze and cue movement patterns, analyze and modify body mechanics/ergonomics, assess and modify postural abnormalities and instruct in home and community integration to attain remaining goals. [x]  See Plan of Care  [x]  See progress note/recertification  []  See Discharge Summary         Progress towards goals / Updated goals:  Short Term Goals: To be accomplished in 2 weeks:  1. Pt will report compliance and independence to [facility] to help the pt manage their pain and symptoms.             Eval: established   Current: Met 8/21/19 8/28/19  Long Term Goals: To be accomplished in 5 weeks:  1. Pt will increase FOTO score to 74 points to improve ability to perform ADLs. Eval: 56 points  Current: Progressing at 63 8/21/19  2. Pt will demonstrate WNL pelvic alignment for 3 consecutive sessions to improve ability lift objects with more ease.   Eval: pelvic asymmetry noted.   Current: Met - symmetry noted 8/21/19  Alignment check with adjustment 8/28/19  3. Pt will increase AROM l/s EXT, B SB, and right rotation to WNL with no increased LBP/right LE pain to improve ability to perform household chores with less pain. Eval: EXT % of WNL. , B SB and right rotation WNL all with increased right LBP  Current: Progressing at 60% on 8/21/19   4.  Pt will report being able to sit for a half a day at work with no increased pain to improve sitting tolerance needed for work and daily tasks.   Eval: reports having to get up at work frequently secondary to increased pain  Current: Progressing Has to get up periodically 8/2/19.        PLAN  [x]  Upgrade activities as tolerated     [x]  Continue plan of care  []  Update interventions per flow sheet       []  Discharge due to:_  []  Other:_      Abrahan Hewitt, PT 8/28/2019  6:08 PM    Future Appointments   Date Time Provider Eliz Meier   8/30/2019  4:30 PM Jyothi Thornton, PT [facility]

## 2019-08-30 ENCOUNTER — HOSPITAL ENCOUNTER (OUTPATIENT)
Dept: PHYSICAL THERAPY | Age: 59
Discharge: HOME | End: 2019-08-30
Payer: COMMERCIAL

## 2019-08-30 PROCEDURE — 97112 NEUROMUSCULAR REEDUCATION: CPT

## 2019-08-30 PROCEDURE — 97535 SELF CARE MNGMENT TRAINING: CPT

## 2019-08-30 NOTE — PROGRESS NOTES
PT DAILY TREATMENT NOTE 10-18    Patient Name: Niurka Bhandari  Date:2019  : 1960  [x]  Patient  Verified  Payor: BLUE CROSS / Plan: [facility] PPO / Product Type: PPO /    In time: 4:47      Out time: 5:23  Total Treatment Time (min): 34 (2 min wait on therapist)  Visit #: 7 of 10    Medicare/BCBS Only   Total Timed Codes (min): 24 1:1 Treatment Time: 24       Treatment Area: Low back pain [M54.5]    SUBJECTIVE  Pain Level (0-10 scale): 0  Any medication changes, allergies to medications, adverse drug reactions, diagnosis change, or new procedure performed?: [x] No    [] Yes (see summary sheet for update)  Subjective functional status/changes:   [] No changes reported  Pt reports having some pain over the past few days and took ibuprofen which helped with the pain. Pt reports she has been stressed out about work lately as well.      OBJECTIVE    Modality rationale: decrease pain and increase tissue extensibility to improve the patients ability to aid with increase tolerance to ADLs   Min Type Additional Details    [] Estim:  []Unatt       []IFC  []Premod                        []Other:  []w/ice   []w/heat  Position:  Location:    [] Estim: []Att    []TENS instruct  []NMES                    []Other:  []w/US   []w/ice   []w/heat  Position:  Location:    []  Traction: [] Cervical       []Lumbar                       [] Prone          []Supine                       []Intermittent   []Continuous Lbs:  [] before manual  [] after manual    []  Ultrasound: []Continuous   [] Pulsed                           []1MHz   []3MHz W/cm2:  Location:    []  Iontophoresis with dexamethasone         Location: [] Take home patch   [] In clinic   10 []  Ice     [x]  Heat   []  Ice massage  []  Laser   []  Anodyne Position:prone  Location:B L/S    []  Laser with stim  []  Other:  Position:  Location:    []  Vasopneumatic Device Pressure:       [] lo [] med [] hi   Temperature: [] lo [] med [] hi   [] Skin assessment post-treatment:  []intact []redness- no adverse reaction    []redness  adverse reaction:      12 min Neuromuscular Re-education:  [x]  See flow sheet :   Rationale: increase ROM, increase strength, improve coordination and improve balance  to improve the patients ability to perform functional tasks    12 min Self Care/Home management:  []  See flow sheet : pt education and discussion with pt on stress management and how stress can affect her pain/tightness (using a heating pad for 15-20 mins on her low back to improve tightness/pain, going for a short walk to improve mobility/tightness)   Rationale: improve pain and muscle tightness  to improve the patients ability to tolerate functional tasks    2 min Manual Therapy:  Pelvic alignment and leg length assessment   Rationale: decrease pain, increase ROM and increase tissue extensibility to activity tolerance        With   [x] TE   [x] TA   [] neuro   [] other: Patient Education: [x] Review HEP    [x] Progressed/Changed HEP based on:   [] positioning   [] body mechanics   [] transfers   [] heat/ice application    [] other:      Other Objective/Functional Measures: Held some exercises secondary to pt being late to therapy today and pt feeling stressed out about work. Pelvic alignment and leg length WNL today. Needs cues for proper form of suzy exercises to improve core stability and posture. Pain Level (0-10 scale) post treatment: 0.25 right lateral lower LE    ASSESSMENT/Changes in Function: Reported pain in the right lateral lower LE with lateral pushouts today. Pt states that she gets this pain with prolonged ambulation as well. Reported improvement in this pain after MHP post session. Reported no pain with other exercises. Discussed with pt how stress can affect her pain levels. We will plan on continuing therapy to improve strength and core stability and strength. Continue POC as tolerated.      Patient will continue to benefit from skilled PT services to modify and progress therapeutic interventions, address functional mobility deficits, address ROM deficits, address strength deficits, analyze and address soft tissue restrictions, analyze and cue movement patterns, analyze and modify body mechanics/ergonomics, assess and modify postural abnormalities and instruct in home and community integration to attain remaining goals. []  See Plan of Care  []  See progress note/recertification  []  See Discharge Summary         Progress towards goals / Updated goals:  Short Term Goals: To be accomplished in 2 weeks:  1. Pt will report compliance and independence to [facility] to help the pt manage their pain and symptoms.             Eval: established   Current: Met 8/21/19 8/28/19  Long Term Goals: To be accomplished in 5 weeks:  1. Pt will increase FOTO score to 74 points to improve ability to perform ADLs. Eval: 56 points  Current: Progressing at 63 8/21/19  2. Pt will demonstrate WNL pelvic alignment for 3 consecutive sessions to improve ability lift objects with more ease.   Eval: pelvic asymmetry noted.   Current: Met - symmetry noted 8/21/19  Alignment check with adjustment 8/28/19  3. Pt will increase AROM l/s EXT, B SB, and right rotation to WNL with no increased LBP/right LE pain to improve ability to perform household chores with less pain. Eval: EXT % of WNL. , B SB and right rotation WNL all with increased right LBP  Current: Progressing at 60% on 8/21/19   4.  Pt will report being able to sit for a half a day at work with no increased pain to improve sitting tolerance needed for work and daily tasks.   Eval: reports having to get up at work frequently secondary to increased pain  Current: Progressing Has to get up periodically 8/2/19.      PLAN  [x]  Upgrade activities as tolerated     [x]  Continue plan of care  [x]  Update interventions per flow sheet       []  Discharge due to:_  []  Other:_      Viviana Vega, PT 8/30/2019  4:50 PM    No future appointments.

## 2019-09-10 ENCOUNTER — HOSPITAL ENCOUNTER (OUTPATIENT)
Dept: PHYSICAL THERAPY | Age: 59
Discharge: HOME | End: 2019-09-10
Payer: COMMERCIAL

## 2019-09-10 PROCEDURE — 97110 THERAPEUTIC EXERCISES: CPT | Performed by: PHYSICAL THERAPIST

## 2019-09-10 PROCEDURE — 97530 THERAPEUTIC ACTIVITIES: CPT | Performed by: PHYSICAL THERAPIST

## 2019-09-10 NOTE — PROGRESS NOTES
PT DAILY TREATMENT NOTE 10-18    Patient Name: Luis Henderson  Date:9/10/2019  : 1960  [x]  Patient  Verified  Payor: BLUE CROSS / Plan: [facility] PPO / Product Type: PPO /    In time:5:15P  Out time: 6:00P  Total Treatment Time (min): 45  Visit #: 8 of 10    Medicare/BCBS Only   Total Timed Codes (min):  30 1:1 Treatment Time:  30       Treatment Area: Low back pain [M54.5]    SUBJECTIVE  Pain Level (0-10 scale): 0.25  Any medication changes, allergies to medications, adverse drug reactions, diagnosis change, or new procedure performed?: [x] No    [] Yes (see summary sheet for update)  Subjective functional status/changes:   [] No changes reported  Patient is making steady progress towards goals, reports being pleased with her progress. OBJECTIVE    Modality rationale: decrease edema, decrease inflammation and decrease pain to improve the patients ability to increase activity tolerance.     Min Type Additional Details    [] Estim:  []Unatt       []IFC  []Premod                        []Other:  []w/ice   []w/heat  Position:  Location:    [] Estim: []Att    []TENS instruct  []NMES                    []Other:  []w/US   []w/ice   []w/heat  Position:  Location:    []  Traction: [] Cervical       []Lumbar                       [] Prone          []Supine                       []Intermittent   []Continuous Lbs:  [] before manual  [] after manual    []  Ultrasound: []Continuous   [] Pulsed                           []1MHz   []3MHz W/cm2:  Location:    []  Iontophoresis with dexamethasone         Location: [] Take home patch   [] In clinic   15 []  Ice     [x]  heat  []  Ice massage  []  Laser   []  Anodyne Position: prone  Location: low back pain    []  Laser with stim  []  Other:  Position:  Location:    []  Vasopneumatic Device Pressure:       [] lo [] med [] hi   Temperature: [] lo [] med [] hi   [] Skin assessment post-treatment:  []intact []redness- no adverse reaction    []redness  adverse reaction:     12 min Therapeutic Exercise:  [x] See flow sheet :   Rationale: increase ROM and increase strength to improve the patients ability to A/ROM and decrease pain with movement. 18 min Therapeutic Activity:  [x]  See flow sheet :   Rationale: increase strength, improve coordination and improve balance  to improve the patients ability to Tolerate basic ADLs and job-related tasks without pain. With   [x] TE   [x] TA   [] neuro   [] other: Patient Education: [x] Review HEP    [x] Progressed/Changed HEP based on:   [x] positioning   [] body mechanics   [] transfers   [] heat/ice application    [] other:      Other Objective/Functional Measures: increased paraspinal muscle turgor     Pain Level (0-10 scale) post treatment: 1-2/10    ASSESSMENT/Changes in Function: Patient is progressing towards goals of care and increased activity tolerance, decreased pain overall. Patient will continue to benefit from skilled PT services to modify and progress therapeutic interventions, address functional mobility deficits, address ROM deficits, address strength deficits, analyze and address soft tissue restrictions, analyze and cue movement patterns and assess and modify postural abnormalities to attain remaining goals. [x]  See Plan of Care  []  See progress note/recertification  []  See Discharge Summary         Progress towards goals / Updated goals:  Short Term Goals: To be accomplished in 2 weeks:  1. Pt will report compliance and independence to [facility] to help the pt manage their pain and symptoms.             Eval: established   Current: Met 8/21/19 8/28/19  Long Term Goals: To be accomplished in 5 weeks:  1. Pt will increase FOTO score to 74 points to improve ability to perform ADLs. Eval: 56 points  Current: Progressing at 63 8/21/19  2.  Pt will demonstrate WNL pelvic alignment for 3 consecutive sessions to improve ability lift objects with more ease.   Eval: pelvic asymmetry noted.   Current: Met - symmetry noted 8/21/19  Alignment check with adjustment 8/28/19, symmetry noted 9/10/19  3. Pt will increase AROM l/s EXT, B SB, and right rotation to WNL with no increased LBP/right LE pain to improve ability to perform household chores with less pain. Eval: EXT % of WNL. , B SB and right rotation WNL all with increased right LBP  Current: Progressing at 60% on 8/21/19   4.  Pt will report being able to sit for a half a day at work with no increased pain to improve sitting tolerance needed for work and daily tasks.   Eval: reports having to get up at work frequently secondary to increased pain  Current: Progressing Has to get up periodically 8/2/19.     PLAN  [x]  Upgrade activities as tolerated     []  Continue plan of care  []  Update interventions per flow sheet       []  Discharge due to:_  []  Other:_      Roxana Morin, PT 9/10/2019  5:40 PM    Future Appointments   Date Time Provider Eliz Meier   9/11/2019  6:00 PM Nadege Loredo, PT [facility]   9/13/2019  4:00 PM Danika Angel, PT [facility]

## 2019-09-11 ENCOUNTER — HOSPITAL ENCOUNTER (OUTPATIENT)
Dept: PHYSICAL THERAPY | Age: 59
Discharge: HOME | End: 2019-09-11
Payer: COMMERCIAL

## 2019-09-11 PROCEDURE — 97112 NEUROMUSCULAR REEDUCATION: CPT

## 2019-09-11 PROCEDURE — 97140 MANUAL THERAPY 1/> REGIONS: CPT

## 2019-09-11 NOTE — PROGRESS NOTES
PT DAILY TREATMENT NOTE 10-18    Patient Name: Medardo Torres  Date:2019  : 1960  [x]  Patient  Verified  Payor: BLUE CROSS / Plan: [facility] PPO / Product Type: PPO /    In time: 6:00    Out time: 6:57  Total Treatment Time (min): 55 (2 min wait on therapist)  Visit #: 9 of 10    Medicare/BCBS Only   Total Timed Codes (min): 45 1:1 Treatment Time: 25       Treatment Area: Low back pain [M54.5]    SUBJECTIVE  Pain Level (0-10 scale): 0.25  Any medication changes, allergies to medications, adverse drug reactions, diagnosis change, or new procedure performed?: [x] No    [] Yes (see summary sheet for update)  Subjective functional status/changes:   [] No changes reported  Pt reports that she continues to have some discomfort in the right LBP region.      OBJECTIVE    Modality rationale: decrease pain and increase tissue extensibility to improve the patients ability to aid with increase tolerance to ADLs   Min Type Additional Details    [] Estim:  []Unatt       []IFC  []Premod                        []Other:  []w/ice   []w/heat  Position:  Location:    [] Estim: []Att    []TENS instruct  []NMES                    []Other:  []w/US   []w/ice   []w/heat  Position:  Location:    []  Traction: [] Cervical       []Lumbar                       [] Prone          []Supine                       []Intermittent   []Continuous Lbs:  [] before manual  [] after manual    []  Ultrasound: []Continuous   [] Pulsed                           []1MHz   []3MHz W/cm2:  Location:    []  Iontophoresis with dexamethasone         Location: [] Take home patch   [] In clinic   10 []  Ice     [x]  Heat   []  Ice massage  []  Laser   []  Anodyne Position:prone  Location:B L/S    []  Laser with stim  []  Other:  Position:  Location:    []  Vasopneumatic Device Pressure:       [] lo [] med [] hi   Temperature: [] lo [] med [] hi   [] Skin assessment post-treatment:  []intact []redness- no adverse reaction    []redness  adverse reaction:      25 min Therapeutic Exercise:  [x] See flow sheet :   Rationale: increase ROM and increase strength to improve the patients ability to tolerate ADLs    8 min Neuromuscular Re-education:  [x]  See flow sheet :   Rationale: increase ROM, increase strength and improve coordination  to improve the patients ability to perform functional tasks    12 min Manual Therapy:  Pelvic alignment and leg length assessment, MET to correct right posterior innominate, shotgun technique; pt education on performing self-MET to correct right posterior innominate at home   Rationale: decrease pain, increase ROM and increase tissue extensibility to activity tolerance        With   [x] TE   [] TA   [] neuro   [x] other: manual Patient Education: [x] Review HEP    [x] Progressed/Changed HEP based on:   [] positioning   [] body mechanics   [] transfers   [] heat/ice application    [x] other: (see above)      Other Objective/Functional Measures: Improvement in pelvic alignment noted post manual interventions. Pain Level (0-10 scale) post treatment: 0.25    ASSESSMENT/Changes in Function: Reported improvement in pain post manual interventions but reported having some discomfort with l/s flex post session after laying prone with the MHP. Educated pt that she can try the MHP in a different position NV if needed to improve pain/symptoms. Good TA contraction noted with SBx3 exercise. Continue POC as tolerated. Patient will continue to benefit from skilled PT services to modify and progress therapeutic interventions, address functional mobility deficits, address ROM deficits, address strength deficits, analyze and address soft tissue restrictions, analyze and cue movement patterns, analyze and modify body mechanics/ergonomics, assess and modify postural abnormalities and instruct in home and community integration to attain remaining goals.      []  See Plan of Care  []  See progress note/recertification  []  See Discharge Summary         Progress towards goals / Updated goals:  Short Term Goals: To be accomplished in 2 weeks:  1. Pt will report compliance and independence to [facility] to help the pt manage their pain and symptoms.             Eval: established   Current: Met 8/21/19 8/28/19  Long Term Goals: To be accomplished in 5 weeks:  1. Pt will increase FOTO score to 74 points to improve ability to perform ADLs. Eval: 56 points  Current: Progressing at 63 8/21/19  2. Pt will demonstrate WNL pelvic alignment for 3 consecutive sessions to improve ability lift objects with more ease.   Eval: pelvic asymmetry noted.   Current: Met - symmetry noted 8/21/19  Alignment check with adjustment 8/28/19  3. Pt will increase AROM l/s EXT, B SB, and right rotation to WNL with no increased LBP/right LE pain to improve ability to perform household chores with less pain. Eval: EXT % of WNL. , B SB and right rotation WNL all with increased right LBP  Current: Progressing at 60% on 8/21/19   4.  Pt will report being able to sit for a half a day at work with no increased pain to improve sitting tolerance needed for work and daily tasks.   Eval: reports having to get up at work frequently secondary to increased pain  Current: Progressing Has to get up periodically 8/2/19.      PLAN  [x]  Upgrade activities as tolerated     [x]  Continue plan of care  [x]  Update interventions per flow sheet       []  Discharge due to:_  []  Other:_      Meg Lopez, PT 9/11/2019  6:33 PM    Future Appointments   Date Time Provider Eliz Meier   9/11/2019  6:00 PM Mayra Parisi, PT [facility]   9/13/2019  4:00 PM Kacey Hebert, PT [facility]

## 2019-09-13 ENCOUNTER — HOSPITAL ENCOUNTER (OUTPATIENT)
Dept: PHYSICAL THERAPY | Age: 59
Discharge: HOME | End: 2019-09-13
Payer: COMMERCIAL

## 2019-09-13 PROCEDURE — 97530 THERAPEUTIC ACTIVITIES: CPT | Performed by: PHYSICAL THERAPIST

## 2019-09-13 PROCEDURE — 97110 THERAPEUTIC EXERCISES: CPT

## 2019-09-13 PROCEDURE — 97530 THERAPEUTIC ACTIVITIES: CPT

## 2019-09-13 PROCEDURE — 97164 PT RE-EVAL EST PLAN CARE: CPT | Performed by: PHYSICAL THERAPIST

## 2019-09-13 PROCEDURE — 97110 THERAPEUTIC EXERCISES: CPT | Performed by: PHYSICAL THERAPIST

## 2019-09-13 PROCEDURE — 97164 PT RE-EVAL EST PLAN CARE: CPT

## 2020-03-12 ENCOUNTER — OFFICE VISIT (OUTPATIENT)
Dept: FAMILY MEDICINE CLINIC | Age: 60
End: 2020-03-12

## 2020-03-12 VITALS
TEMPERATURE: 98.9 F | OXYGEN SATURATION: 98 % | HEIGHT: 66 IN | DIASTOLIC BLOOD PRESSURE: 85 MMHG | HEART RATE: 69 BPM | SYSTOLIC BLOOD PRESSURE: 150 MMHG | BODY MASS INDEX: 27 KG/M2 | WEIGHT: 168 LBS | RESPIRATION RATE: 18 BRPM

## 2020-03-12 DIAGNOSIS — J30.1 SEASONAL ALLERGIC RHINITIS DUE TO POLLEN: ICD-10-CM

## 2020-03-12 DIAGNOSIS — I10 ESSENTIAL HYPERTENSION: Primary | ICD-10-CM

## 2020-03-12 NOTE — PATIENT INSTRUCTIONS
High Blood Pressure: Care Instructions Overview It's normal for blood pressure to go up and down throughout the day. But if it stays up, you have high blood pressure. Another name for high blood pressure is hypertension. Despite what a lot of people think, high blood pressure usually doesn't cause headaches or make you feel dizzy or lightheaded. It usually has no symptoms. But it does increase your risk of stroke, heart attack, and other problems. You and your doctor will talk about your risks of these problems based on your blood pressure. Your doctor will give you a goal for your blood pressure. Your goal will be based on your health and your age. Lifestyle changes, such as eating healthy and being active, are always important to help lower blood pressure. You might also take medicine to reach your blood pressure goal. 
Follow-up care is a key part of your treatment and safety. Be sure to make and go to all appointments, and call your doctor if you are having problems. It's also a good idea to know your test results and keep a list of the medicines you take. How can you care for yourself at home? Medical treatment · If you stop taking your medicine, your blood pressure will go back up. You may take one or more types of medicine to lower your blood pressure. Be safe with medicines. Take your medicine exactly as prescribed. Call your doctor if you think you are having a problem with your medicine. · Talk to your doctor before you start taking aspirin every day. Aspirin can help certain people lower their risk of a heart attack or stroke. But taking aspirin isn't right for everyone, because it can cause serious bleeding. · See your doctor regularly. You may need to see the doctor more often at first or until your blood pressure comes down. · If you are taking blood pressure medicine, talk to your doctor before you take decongestants or anti-inflammatory medicine, such as ibuprofen. Some of these medicines can raise blood pressure. · Learn how to check your blood pressure at home. Lifestyle changes · Stay at a healthy weight. This is especially important if you put on weight around the waist. Losing even 10 pounds can help you lower your blood pressure. · If your doctor recommends it, get more exercise. Walking is a good choice. Bit by bit, increase the amount you walk every day. Try for at least 30 minutes on most days of the week. You also may want to swim, bike, or do other activities. · Avoid or limit alcohol. Talk to your doctor about whether you can drink any alcohol. · Try to limit how much sodium you eat to less than 2,300 milligrams (mg) a day. Your doctor may ask you to try to eat less than 1,500 mg a day. · Eat plenty of fruits (such as bananas and oranges), vegetables, legumes, whole grains, and low-fat dairy products. · Lower the amount of saturated fat in your diet. Saturated fat is found in animal products such as milk, cheese, and meat. Limiting these foods may help you lose weight and also lower your risk for heart disease. · Do not smoke. Smoking increases your risk for heart attack and stroke. If you need help quitting, talk to your doctor about stop-smoking programs and medicines. These can increase your chances of quitting for good. When should you call for help? Call  911 anytime you think you may need emergency care. This may mean having symptoms that suggest that your blood pressure is causing a serious heart or blood vessel problem. Your blood pressure may be over 180/120. 
 For example, call  911 if: 
  · You have symptoms of a heart attack. These may include: 
? Chest pain or pressure, or a strange feeling in the chest. 
? Sweating. ? Shortness of breath. ? Nausea or vomiting. ? Pain, pressure, or a strange feeling in the back, neck, jaw, or upper belly or in one or both shoulders or arms. ? Lightheadedness or sudden weakness. ? A fast or irregular heartbeat.  
  · You have symptoms of a stroke. These may include: 
? Sudden numbness, tingling, weakness, or loss of movement in your face, arm, or leg, especially on only one side of your body. ? Sudden vision changes. ? Sudden trouble speaking. ? Sudden confusion or trouble understanding simple statements. ? Sudden problems with walking or balance. ? A sudden, severe headache that is different from past headaches.  
  · You have severe back or belly pain.  
 Do not wait until your blood pressure comes down on its own. Get help right away. 
 Call your doctor now or seek immediate care if: 
  · Your blood pressure is much higher than normal (such as 180/120 or higher), but you don't have symptoms.  
  · You think high blood pressure is causing symptoms, such as: 
? Severe headache. 
? Blurry vision.  
 Watch closely for changes in your health, and be sure to contact your doctor if: 
  · Your blood pressure measures higher than your doctor recommends at least 2 times. That means the top number is higher or the bottom number is higher, or both.  
  · You think you may be having side effects from your blood pressure medicine. Where can you learn more? Go to http://matt-hannah.info/ Enter C539 in the search box to learn more about \"High Blood Pressure: Care Instructions. \" Current as of: December 15, 2019Content Version: 12.4 © 9620-6755 [employer]. Care instructions adapted under license by [employer] (which disclaims liability or warranty for this information). If you have questions about a medical condition or this instruction, always ask your healthcare professional. [place] [address] any warranty or liability for your use of this information.

## 2020-03-12 NOTE — PROGRESS NOTES
Patient is in the office today for elevated blood pressure. 1. Have you been to the ER, urgent care clinic since your last visit? Hospitalized since your last visit? Yes, [facility] / Rodrigo Wyatt     2. Have you seen or consulted any other health care providers outside of the [address] [place] [address] since your last visit? Include any pap smears or colon screening.  No

## 2020-03-18 NOTE — PROGRESS NOTES
Xiao Garcia is a 61 y.o.  female and presents with Blood Pressure Check (120/65 ) and Allergic Rhinitis      SUBJECTIVE:    Pt presents with BP being elevated recently. She was placed on Micardis 80 mg in the past. She will try to take it on a regular basis and monitor her BP at home which has been down to 126/65. She will also continue to work on diet and weight loss. Pt is taking allergy shots which have not significantly improve her symptoms. She can continue to use Flonase and Singulair as needed. Respiratory ROS: negative for - shortness of breath  Cardiovascular ROS: negative for - chest pain    Current Outpatient Medications   Medication Sig    telmisartan (MICARDIS) 80 mg tablet Take 1 Tab by mouth daily.  ibuprofen (MOTRIN) 800 mg tablet Take 1 Tab by mouth every eight (8) hours as needed for Pain.  cyclobenzaprine (FLEXERIL) 5 mg tablet Take 1 Tab by mouth nightly. Indications: muscle spasm    butalbital-acetaminophen-caffeine (FIORICET, ESGIC) -40 mg per tablet Take 1 Tab by mouth every four (4) hours as needed for Headache.  fluticasone (FLONASE) 50 mcg/actuation nasal spray 2 Sprays by Both Nostrils route daily.  montelukast (SINGULAIR) 10 mg tablet Take 1 Tab by mouth daily.  potassium chloride (K-DUR, KLOR-CON) 20 mEq tablet Take 1 Tab by mouth daily.  cetirizine (ZYRTEC) 10 mg tablet Take 1 Tab by mouth daily.  clindamycin-benzoyl peroxide (BENZACLIN) 1-5 % topical gel Apply  to affected area two (2) times a day. Apply to affected area after the skin has been cleansed and dried.  multivitamin with iron-mineral 0.8 mg Cmpk Take  by mouth.  ondansetron (ZOFRAN ODT) 4 mg disintegrating tablet Take 1 Tab by mouth every eight (8) hours as needed for Nausea. No current facility-administered medications for this visit.           OBJECTIVE:  alert, well appearing, and in no distress  Visit Vitals  /85 (BP 1 Location: Left arm, BP Patient Position: Sitting)   Pulse 69   Temp 98.9 °F (37.2 °C) (Oral)   Resp 18   Ht 5' 6\" (1.676 m)   Wt 168 lb (76.2 kg)   LMP 05/03/2016   SpO2 98%   BMI 27.12 kg/m²      well developed and well nourished  Chest - clear to auscultation, no wheezes, rales or rhonchi, symmetric air entry  Heart - normal rate, regular rhythm, normal S1, S2, no murmurs, rubs, clicks or gallops  Extremities - peripheral pulses normal, no pedal edema, no clubbing or cyanosis    Labs:   Lab Results   Component Value Date/Time    Cholesterol, total 204 (H) 09/27/2017 09:00 AM    HDL Cholesterol 72 (H) 09/27/2017 09:00 AM    LDL, calculated 120.8 (H) 09/27/2017 09:00 AM    Triglyceride 56 09/27/2017 09:00 AM    CHOL/HDL Ratio 2.8 09/27/2017 09:00 AM     Lab Results   Component Value Date/Time    Sodium 143 01/09/2019 11:05 AM    Potassium 3.7 01/09/2019 11:05 AM    Chloride 106 01/09/2019 11:05 AM    CO2 33 (H) 01/09/2019 11:05 AM    Anion gap 4 01/09/2019 11:05 AM    Glucose 89 01/09/2019 11:05 AM    BUN 13 01/09/2019 11:05 AM    Creatinine 0.71 01/09/2019 11:05 AM    BUN/Creatinine ratio 18 01/09/2019 11:05 AM    GFR est AA >60 01/09/2019 11:05 AM    GFR est non-AA >60 01/09/2019 11:05 AM    Calcium 9.3 01/09/2019 11:05 AM    Bilirubin, total 0.7 01/02/2019 12:47 PM    ALT (SGPT) 35 01/02/2019 12:47 PM    AST (SGOT) 19 01/02/2019 12:47 PM    Alk. phosphatase 73 01/02/2019 12:47 PM    Protein, total 6.7 01/02/2019 12:47 PM    Albumin 3.8 01/02/2019 12:47 PM    Globulin 2.9 01/02/2019 12:47 PM    A-G Ratio 1.3 01/02/2019 12:47 PM        Discussed the patient's BMI with her. The BMI follow up plan is as follows: I have counseled this patient on diet and exercise regimens. Assessment/Plan      ICD-10-CM ICD-9-CM    1. Essential hypertension I10 401.9 Uncontrolled. Pt to take micardis 80 mg on a regular basis    2. Seasonal allergic rhinitis due to pollen J30.1 477.0 Not significantly improved with allergy shots.  Pt can use on Flonase and Singulair as needed. Follow-up and Dispositions    · Return in about 3 months (around 6/12/2020). Reviewed plan of care. Patient has provided input and agrees with goals.

## 2020-06-16 ENCOUNTER — VIRTUAL VISIT (OUTPATIENT)
Dept: INTERNAL MEDICINE CLINIC | Age: 60
End: 2020-06-16

## 2020-06-16 DIAGNOSIS — Z12.31 BREAST CANCER SCREENING BY MAMMOGRAM: ICD-10-CM

## 2020-06-16 DIAGNOSIS — J30.1 SEASONAL ALLERGIC RHINITIS DUE TO POLLEN: ICD-10-CM

## 2020-06-16 DIAGNOSIS — I10 ESSENTIAL HYPERTENSION: Primary | ICD-10-CM

## 2020-06-16 DIAGNOSIS — E55.9 VITAMIN D DEFICIENCY: ICD-10-CM

## 2020-06-16 RX ORDER — TELMISARTAN 80 MG/1
80 TABLET ORAL DAILY
Qty: 90 TAB | Refills: 3 | Status: SHIPPED | OUTPATIENT
Start: 2020-06-16 | End: 2021-07-12

## 2020-06-20 NOTE — PROGRESS NOTES
Xavier Mahonye 61 y.o. female who was seen by synchronous (real-time) audio-video technology on 06/16/20    Consent:  sheand/or her healthcare decision maker is aware that this patient-initiated Telehealth encounter is a billable service, with coverage as determined by her insurance carrier. she is aware that she may receive a bill and has provided verbal consent to proceed: Yes I was in my office while conducting this encounter. The patient was in their home. Xavier Mahoney is a 61 y.o.  female and presents with Hypertension; Vitamin D Deficiency; and Allergic Rhinitis      SUBJECTIVE:    Pt's HTN is better controlled on Micardis 80 mg at home. in the past. She will continue to monitor blood pressure at home. She will also continue to work on diet and weight loss. Pt is taking allergy shots for allergic rhinitis. . She can continue to use Flonase and Singulair as needed. Patient is at risk for vitamin D deficiency so we will check levels with next blood work. Respiratory ROS: negative for - shortness of breath  Cardiovascular ROS: negative for - chest pain    Current Outpatient Medications   Medication Sig    telmisartan (MICARDIS) 80 mg tablet Take 1 Tab by mouth daily.  ibuprofen (MOTRIN) 800 mg tablet Take 1 Tab by mouth every eight (8) hours as needed for Pain.  cyclobenzaprine (FLEXERIL) 5 mg tablet Take 1 Tab by mouth nightly. Indications: muscle spasm    butalbital-acetaminophen-caffeine (FIORICET, ESGIC) -40 mg per tablet Take 1 Tab by mouth every four (4) hours as needed for Headache.  fluticasone (FLONASE) 50 mcg/actuation nasal spray 2 Sprays by Both Nostrils route daily.  montelukast (SINGULAIR) 10 mg tablet Take 1 Tab by mouth daily.  potassium chloride (K-DUR, KLOR-CON) 20 mEq tablet Take 1 Tab by mouth daily.  cetirizine (ZYRTEC) 10 mg tablet Take 1 Tab by mouth daily.     ondansetron (ZOFRAN ODT) 4 mg disintegrating tablet Take 1 Tab by mouth every eight (8) hours as needed for Nausea.  clindamycin-benzoyl peroxide (BENZACLIN) 1-5 % topical gel Apply  to affected area two (2) times a day. Apply to affected area after the skin has been cleansed and dried.  multivitamin with iron-mineral 0.8 mg Cmpk Take  by mouth. No current facility-administered medications for this visit. OBJECTIVE:  alert, well appearing, and in no distress  Visit Vitals  LMP 05/03/2016      Patient-Reported Vitals 6/16/2020   Patient-Reported Pulse 80   Patient-Reported Systolic  899   Patient-Reported Diastolic 79        well developed and well nourished      Labs:   Lab Results   Component Value Date/Time    Cholesterol, total 204 (H) 09/27/2017 09:00 AM    HDL Cholesterol 72 (H) 09/27/2017 09:00 AM    LDL, calculated 120.8 (H) 09/27/2017 09:00 AM    Triglyceride 56 09/27/2017 09:00 AM    CHOL/HDL Ratio 2.8 09/27/2017 09:00 AM     Lab Results   Component Value Date/Time    Sodium 143 01/09/2019 11:05 AM    Potassium 3.7 01/09/2019 11:05 AM    Chloride 106 01/09/2019 11:05 AM    CO2 33 (H) 01/09/2019 11:05 AM    Anion gap 4 01/09/2019 11:05 AM    Glucose 89 01/09/2019 11:05 AM    BUN 13 01/09/2019 11:05 AM    Creatinine 0.71 01/09/2019 11:05 AM    BUN/Creatinine ratio 18 01/09/2019 11:05 AM    GFR est AA >60 01/09/2019 11:05 AM    GFR est non-AA >60 01/09/2019 11:05 AM    Calcium 9.3 01/09/2019 11:05 AM    Bilirubin, total 0.7 01/02/2019 12:47 PM    ALT (SGPT) 35 01/02/2019 12:47 PM    Alk. phosphatase 73 01/02/2019 12:47 PM    Protein, total 6.7 01/02/2019 12:47 PM    Albumin 3.8 01/02/2019 12:47 PM    Globulin 2.9 01/02/2019 12:47 PM    A-G Ratio 1.3 01/02/2019 12:47 PM        Discussed the patient's BMI with her. The BMI follow up plan is as follows: I have counseled this patient on diet and exercise regimens. Assessment/Plan      ICD-10-CM ICD-9-CM    1.  Essential hypertension I10 401.9  controlled on Micardis 80 mg LIPID PANEL      METABOLIC PANEL, COMPREHENSIVE   2. Seasonal allergic rhinitis due to pollen J30.1 477.0  improved with allergy shots per ENT. 3. Vitamin D deficiency E55.9 268.9  patient at risk will check vITAMIN D, 25 HYDROXY   4. Breast cancer screening by mammogram Z12.31 V76.12 [facility] MAMMO BI SCREENING INCL CAD       Follow-up and Dispositions    · Return in about 6 months (around 12/16/2020) for labs 1 week before. Reviewed plan of care. Patient has provided input and agrees with goals.

## 2020-06-25 ENCOUNTER — TELEPHONE (OUTPATIENT)
Dept: INTERNAL MEDICINE CLINIC | Age: 60
End: 2020-06-25

## 2020-06-25 NOTE — TELEPHONE ENCOUNTER
----- Message from Jeff Ferris MD sent at 6/20/2020  1:59 PM EDT -----  Pt needs OV in 6 months and labs 1 week before

## 2020-11-12 ENCOUNTER — TRANSCRIBE ORDER (OUTPATIENT)
Dept: SCHEDULING | Age: 60
End: 2020-11-12

## 2020-11-12 DIAGNOSIS — J32.0 CHRONIC MAXILLARY SINUSITIS: Primary | ICD-10-CM

## 2020-12-02 ENCOUNTER — HOSPITAL ENCOUNTER (OUTPATIENT)
Dept: CT IMAGING | Age: 60
Discharge: HOME | End: 2020-12-02
Attending: OTOLARYNGOLOGY
Payer: COMMERCIAL

## 2020-12-02 DIAGNOSIS — J32.0 CHRONIC MAXILLARY SINUSITIS: ICD-10-CM

## 2020-12-02 PROCEDURE — 70486 CT MAXILLOFACIAL W/O DYE: CPT

## 2021-03-17 ENCOUNTER — TRANSCRIBE ORDER (OUTPATIENT)
Dept: REGISTRATION | Age: 61
End: 2021-03-17

## 2021-03-17 ENCOUNTER — HOSPITAL ENCOUNTER (OUTPATIENT)
Dept: LAB | Age: 61
Discharge: HOME | End: 2021-03-17
Payer: COMMERCIAL

## 2021-03-17 DIAGNOSIS — J30.89 NON-SEASONAL ALLERGIC RHINITIS, UNSPECIFIED TRIGGER: ICD-10-CM

## 2021-03-17 DIAGNOSIS — J30.89 NON-SEASONAL ALLERGIC RHINITIS, UNSPECIFIED TRIGGER: Primary | ICD-10-CM

## 2021-03-17 LAB
ALBUMIN SERPL-MCNC: 4.1 G/DL (ref 3.4–5)
ALBUMIN/GLOB SERPL: 1.4 {RATIO} (ref 0.8–1.7)
ALP SERPL-CCNC: 87 U/L (ref 45–117)
ALT SERPL-CCNC: 28 U/L (ref 13–56)
ANION GAP SERPL CALC-SCNC: 4 MMOL/L (ref 3–18)
AST SERPL-CCNC: 16 U/L (ref 10–38)
ATRIAL RATE: 63 BPM
BASOPHILS # BLD: 0 K/UL (ref 0–0.1)
BASOPHILS NFR BLD: 0 % (ref 0–2)
BILIRUB SERPL-MCNC: 0.6 MG/DL (ref 0.2–1)
BUN SERPL-MCNC: 13 MG/DL (ref 7–18)
BUN/CREAT SERPL: 19 (ref 12–20)
CALCIUM SERPL-MCNC: 9.2 MG/DL (ref 8.5–10.1)
CALCULATED P AXIS, ECG09: 35 DEGREES
CALCULATED R AXIS, ECG10: 13 DEGREES
CALCULATED T AXIS, ECG11: 33 DEGREES
CHLORIDE SERPL-SCNC: 108 MMOL/L (ref 100–111)
CO2 SERPL-SCNC: 30 MMOL/L (ref 21–32)
CREAT SERPL-MCNC: 0.7 MG/DL (ref 0.6–1.3)
DIAGNOSIS, 93000: NORMAL
DIFFERENTIAL METHOD BLD: ABNORMAL
EOSINOPHIL # BLD: 0.1 K/UL (ref 0–0.4)
EOSINOPHIL NFR BLD: 1 % (ref 0–5)
ERYTHROCYTE [DISTWIDTH] IN BLOOD BY AUTOMATED COUNT: 13.9 % (ref 11.6–14.5)
GLOBULIN SER CALC-MCNC: 3 G/DL (ref 2–4)
GLUCOSE SERPL-MCNC: 78 MG/DL (ref 74–99)
HCT VFR BLD AUTO: 38.7 % (ref 35–45)
HGB BLD-MCNC: 12.8 G/DL (ref 12–16)
LYMPHOCYTES # BLD: 1.9 K/UL (ref 0.9–3.6)
LYMPHOCYTES NFR BLD: 37 % (ref 21–52)
MCH RBC QN AUTO: 30.8 PG (ref 24–34)
MCHC RBC AUTO-ENTMCNC: 33.1 G/DL (ref 31–37)
MCV RBC AUTO: 93 FL (ref 74–97)
MONOCYTES # BLD: 0.4 K/UL (ref 0.05–1.2)
MONOCYTES NFR BLD: 7 % (ref 3–10)
NEUTS SEG # BLD: 2.7 K/UL (ref 1.8–8)
NEUTS SEG NFR BLD: 55 % (ref 40–73)
P-R INTERVAL, ECG05: 168 MS
PLATELET # BLD AUTO: 234 K/UL (ref 135–420)
PMV BLD AUTO: 11.4 FL (ref 9.2–11.8)
POTASSIUM SERPL-SCNC: 3.7 MMOL/L (ref 3.5–5.5)
PROT SERPL-MCNC: 7.1 G/DL (ref 6.4–8.2)
Q-T INTERVAL, ECG07: 424 MS
QRS DURATION, ECG06: 78 MS
QTC CALCULATION (BEZET), ECG08: 433 MS
RBC # BLD AUTO: 4.16 M/UL (ref 4.2–5.3)
SODIUM SERPL-SCNC: 142 MMOL/L (ref 136–145)
VENTRICULAR RATE, ECG03: 63 BPM
WBC # BLD AUTO: 5.1 K/UL (ref 4.6–13.2)

## 2021-03-17 PROCEDURE — 85025 COMPLETE CBC W/AUTO DIFF WBC: CPT

## 2021-03-17 PROCEDURE — 36415 COLL VENOUS BLD VENIPUNCTURE: CPT

## 2021-03-17 PROCEDURE — 80053 COMPREHEN METABOLIC PANEL: CPT

## 2021-03-17 PROCEDURE — 93005 ELECTROCARDIOGRAM TRACING: CPT

## 2021-03-22 ENCOUNTER — HOSPITAL ENCOUNTER (OUTPATIENT)
Age: 61
Setting detail: OBSERVATION
Discharge: HOME | End: 2021-03-24
Attending: EMERGENCY MEDICINE | Admitting: FAMILY MEDICINE
Payer: COMMERCIAL

## 2021-03-22 ENCOUNTER — HOSPITAL ENCOUNTER (OUTPATIENT)
Dept: LAB | Age: 61
Setting detail: OBSERVATION
Discharge: HOME | End: 2021-03-22
Payer: COMMERCIAL

## 2021-03-22 ENCOUNTER — APPOINTMENT (OUTPATIENT)
Dept: CT IMAGING | Age: 61
End: 2021-03-22
Attending: EMERGENCY MEDICINE
Payer: COMMERCIAL

## 2021-03-22 ENCOUNTER — APPOINTMENT (OUTPATIENT)
Dept: GENERAL RADIOLOGY | Age: 61
End: 2021-03-22
Attending: EMERGENCY MEDICINE
Payer: COMMERCIAL

## 2021-03-22 DIAGNOSIS — E87.70 HYPERVOLEMIA, UNSPECIFIED HYPERVOLEMIA TYPE: ICD-10-CM

## 2021-03-22 DIAGNOSIS — R07.9 ACUTE CHEST PAIN: Primary | ICD-10-CM

## 2021-03-22 LAB
ALBUMIN SERPL-MCNC: 3.9 G/DL (ref 3.4–5)
ALBUMIN/GLOB SERPL: 1.1 {RATIO} (ref 0.8–1.7)
ALP SERPL-CCNC: 90 U/L (ref 45–117)
ALT SERPL-CCNC: 28 U/L (ref 13–56)
ANION GAP SERPL CALC-SCNC: 7 MMOL/L (ref 3–18)
APTT PPP: 30.2 SEC (ref 23–36.4)
AST SERPL-CCNC: 16 U/L (ref 10–38)
ATRIAL RATE: 65 BPM
BASOPHILS # BLD: 0 K/UL (ref 0–0.1)
BASOPHILS NFR BLD: 0 % (ref 0–2)
BILIRUB SERPL-MCNC: 0.5 MG/DL (ref 0.2–1)
BNP SERPL-MCNC: 167 PG/ML (ref 0–900)
BUN SERPL-MCNC: 9 MG/DL (ref 7–18)
BUN/CREAT SERPL: 12 (ref 12–20)
CALCIUM SERPL-MCNC: 9.2 MG/DL (ref 8.5–10.1)
CALCULATED P AXIS, ECG09: 78 DEGREES
CALCULATED R AXIS, ECG10: 64 DEGREES
CALCULATED T AXIS, ECG11: 59 DEGREES
CHLORIDE SERPL-SCNC: 104 MMOL/L (ref 100–111)
CK MB CFR SERPL CALC: 1.1 % (ref 0–4)
CK MB CFR SERPL CALC: 1.5 % (ref 0–4)
CK MB SERPL-MCNC: 1 NG/ML (ref 5–25)
CK MB SERPL-MCNC: 1.2 NG/ML (ref 5–25)
CK SERPL-CCNC: 108 U/L (ref 26–192)
CK SERPL-CCNC: 68 U/L (ref 26–192)
CO2 SERPL-SCNC: 30 MMOL/L (ref 21–32)
CREAT SERPL-MCNC: 0.78 MG/DL (ref 0.6–1.3)
DIAGNOSIS, 93000: NORMAL
DIFFERENTIAL METHOD BLD: ABNORMAL
EOSINOPHIL # BLD: 0 K/UL (ref 0–0.4)
EOSINOPHIL NFR BLD: 0 % (ref 0–5)
ERYTHROCYTE [DISTWIDTH] IN BLOOD BY AUTOMATED COUNT: 13.7 % (ref 11.6–14.5)
GLOBULIN SER CALC-MCNC: 3.5 G/DL (ref 2–4)
GLUCOSE SERPL-MCNC: 165 MG/DL (ref 74–99)
HCT VFR BLD AUTO: 39.7 % (ref 35–45)
HGB BLD-MCNC: 13 G/DL (ref 12–16)
INR PPP: 1 (ref 0.8–1.2)
LYMPHOCYTES # BLD: 0.8 K/UL (ref 0.9–3.6)
LYMPHOCYTES NFR BLD: 9 % (ref 21–52)
MCH RBC QN AUTO: 31.1 PG (ref 24–34)
MCHC RBC AUTO-ENTMCNC: 32.7 G/DL (ref 31–37)
MCV RBC AUTO: 95 FL (ref 74–97)
MONOCYTES # BLD: 0.1 K/UL (ref 0.05–1.2)
MONOCYTES NFR BLD: 1 % (ref 3–10)
NEUTS SEG # BLD: 7.9 K/UL (ref 1.8–8)
NEUTS SEG NFR BLD: 90 % (ref 40–73)
P-R INTERVAL, ECG05: 174 MS
PLATELET # BLD AUTO: 192 K/UL (ref 135–420)
PMV BLD AUTO: 10.7 FL (ref 9.2–11.8)
POTASSIUM SERPL-SCNC: 3.4 MMOL/L (ref 3.5–5.5)
PROT SERPL-MCNC: 7.4 G/DL (ref 6.4–8.2)
PROTHROMBIN TIME: 12.9 SEC (ref 11.5–15.2)
Q-T INTERVAL, ECG07: 392 MS
QRS DURATION, ECG06: 76 MS
QTC CALCULATION (BEZET), ECG08: 407 MS
RBC # BLD AUTO: 4.18 M/UL (ref 4.2–5.3)
SODIUM SERPL-SCNC: 141 MMOL/L (ref 136–145)
TROPONIN I SERPL-MCNC: 0.03 NG/ML (ref 0–0.04)
VENTRICULAR RATE, ECG03: 65 BPM
WBC # BLD AUTO: 8.8 K/UL (ref 4.6–13.2)

## 2021-03-22 PROCEDURE — 84484 ASSAY OF TROPONIN QUANT: CPT

## 2021-03-22 PROCEDURE — 74011250636 HC RX REV CODE- 250/636: Performed by: EMERGENCY MEDICINE

## 2021-03-22 PROCEDURE — 88311 DECALCIFY TISSUE: CPT

## 2021-03-22 PROCEDURE — 85730 THROMBOPLASTIN TIME PARTIAL: CPT

## 2021-03-22 PROCEDURE — 96375 TX/PRO/DX INJ NEW DRUG ADDON: CPT

## 2021-03-22 PROCEDURE — 99218 HC RM OBSERVATION: CPT

## 2021-03-22 PROCEDURE — 85025 COMPLETE CBC W/AUTO DIFF WBC: CPT

## 2021-03-22 PROCEDURE — 71250 CT THORAX DX C-: CPT

## 2021-03-22 PROCEDURE — 82553 CREATINE MB FRACTION: CPT

## 2021-03-22 PROCEDURE — 71045 X-RAY EXAM CHEST 1 VIEW: CPT

## 2021-03-22 PROCEDURE — 88305 TISSUE EXAM BY PATHOLOGIST: CPT

## 2021-03-22 PROCEDURE — 74011250637 HC RX REV CODE- 250/637: Performed by: EMERGENCY MEDICINE

## 2021-03-22 PROCEDURE — 99285 EMERGENCY DEPT VISIT HI MDM: CPT

## 2021-03-22 PROCEDURE — 83880 ASSAY OF NATRIURETIC PEPTIDE: CPT

## 2021-03-22 PROCEDURE — 96374 THER/PROPH/DIAG INJ IV PUSH: CPT

## 2021-03-22 PROCEDURE — 85610 PROTHROMBIN TIME: CPT

## 2021-03-22 PROCEDURE — 80053 COMPREHEN METABOLIC PANEL: CPT

## 2021-03-22 PROCEDURE — 93005 ELECTROCARDIOGRAM TRACING: CPT

## 2021-03-22 RX ORDER — ONDANSETRON 2 MG/ML
4 INJECTION INTRAMUSCULAR; INTRAVENOUS
Status: COMPLETED | OUTPATIENT
Start: 2021-03-22 | End: 2021-03-22

## 2021-03-22 RX ORDER — HYDROCODONE BITARTRATE AND ACETAMINOPHEN 5; 325 MG/1; MG/1
1 TABLET ORAL
Status: COMPLETED | OUTPATIENT
Start: 2021-03-22 | End: 2021-03-22

## 2021-03-22 RX ORDER — POTASSIUM CHLORIDE 20 MEQ/1
40 TABLET, EXTENDED RELEASE ORAL
Status: COMPLETED | OUTPATIENT
Start: 2021-03-22 | End: 2021-03-22

## 2021-03-22 RX ORDER — OXYMETAZOLINE HCL 0.05 %
2 SPRAY, NON-AEROSOL (ML) NASAL
Status: COMPLETED | OUTPATIENT
Start: 2021-03-22 | End: 2021-03-22

## 2021-03-22 RX ORDER — FUROSEMIDE 10 MG/ML
20 INJECTION INTRAMUSCULAR; INTRAVENOUS
Status: COMPLETED | OUTPATIENT
Start: 2021-03-22 | End: 2021-03-22

## 2021-03-22 RX ADMIN — OXYMETAZOLINE HCL 2 SPRAY: 0.05 SPRAY NASAL at 18:55

## 2021-03-22 RX ADMIN — ONDANSETRON 4 MG: 2 INJECTION INTRAMUSCULAR; INTRAVENOUS at 17:42

## 2021-03-22 RX ADMIN — HYDROCODONE BITARTRATE AND ACETAMINOPHEN 1 TABLET: 5; 325 TABLET ORAL at 18:55

## 2021-03-22 RX ADMIN — POTASSIUM CHLORIDE 40 MEQ: 1500 TABLET, EXTENDED RELEASE ORAL at 16:36

## 2021-03-22 RX ADMIN — FUROSEMIDE 20 MG: 10 INJECTION, SOLUTION INTRAVENOUS at 16:35

## 2021-03-22 NOTE — Clinical Note
Patient Class[de-identified] OBSERVATION [104]   Type of Bed: Telemetry [19]   Reason for Observation: Acute chest pain, dyspnea   Admitting Diagnosis: Acute chest pain [[id number]]   Admitting Physician: Manuel Jones [[id number]]   Attending Physician: Manuel Jones [[id number]]

## 2021-03-22 NOTE — ED NOTES
When attempting to move patient to hospital bed. Patient began to vomit blood. Appears that blood is coming from nose s/p surgical site. ER attending at bedside. Patient is nauseated with HA.

## 2021-03-22 NOTE — ED TRIAGE NOTES
Patient arrived via ems with c/o chest pain after surgical procedure this morning. Patient at this time states she is pain free. Ambulatory care center sent her here for further evaluation. Patient in no distress did received labetalol and hydralazine by surgery center.      Patient resting quietly

## 2021-03-22 NOTE — ED PROVIDER NOTES
EMERGENCY DEPARTMENT HISTORY AND PHYSICAL EXAM    12:56 PM      Date: 3/22/2021  Patient Name: Cristian Bailey    History of Presenting Illness     Chief Complaint   Patient presents with    Chest Pain         History Provided By: Patient  Location/Duration/Severity/Modifying factors   Patient is a 61-year-old female with a history of hypertension and recurrent sinusitis that presents emergency department after having a functional endoscopic sinus surgery and septoplasty done by Dr. Cally Rudolph. The patient prior to the surgery was having some episodes of chest pain or shortness of breath which she attributed to some bronchitis in her allergies however after the surgery another episode that was little more severe and was found to have an elevated blood pressure. The anesthesiologist gave IV medications for blood pressure and decided to send the patient over to the ED for further evaluation. Patient in the emergency department does her chest pain has totally resolved and has no family history or history of heart disease. Patient denies any nausea, vomiting, diaphoresis, or shortness of breath. Patient does have a nasal packing in place and does have a headache. Patient denies any other aggravating or alleviating factors. Patient is not a smoker, drinker, or drug user, and is a . Patient describes a chest pain as chest tightness and was relieved with an albuterol inhaler yesterday. PCP: Jeannette Alatorre MD    Current Facility-Administered Medications   Medication Dose Route Frequency Provider Last Rate Last Admin    HYDROcodone-acetaminophen (NORCO) 5-325 mg per tablet 1 Tab  1 Tab Oral NOW Niki Vega MD         Current Outpatient Medications   Medication Sig Dispense Refill    telmisartan (MICARDIS) 80 mg tablet Take 1 Tab by mouth daily. 90 Tab 3    ibuprofen (MOTRIN) 800 mg tablet Take 1 Tab by mouth every eight (8) hours as needed for Pain.  90 Tab 1    cyclobenzaprine (FLEXERIL) 5 mg tablet Take 1 Tab by mouth nightly. Indications: muscle spasm 30 Tab 1    butalbital-acetaminophen-caffeine (FIORICET, ESGIC) -40 mg per tablet Take 1 Tab by mouth every four (4) hours as needed for Headache. 60 Tab 0    fluticasone (FLONASE) 50 mcg/actuation nasal spray 2 Sprays by Both Nostrils route daily. 1 Bottle 2    montelukast (SINGULAIR) 10 mg tablet Take 1 Tab by mouth daily. 30 Tab 5    potassium chloride (K-DUR, KLOR-CON) 20 mEq tablet Take 1 Tab by mouth daily. 30 Tab 1    cetirizine (ZYRTEC) 10 mg tablet Take 1 Tab by mouth daily. 30 Tab 0    ondansetron (ZOFRAN ODT) 4 mg disintegrating tablet Take 1 Tab by mouth every eight (8) hours as needed for Nausea. 30 Tab 0    clindamycin-benzoyl peroxide (BENZACLIN) 1-5 % topical gel Apply  to affected area two (2) times a day. Apply to affected area after the skin has been cleansed and dried. 1 Tube 1    multivitamin with iron-mineral 0.8 mg Cmpk Take  by mouth. Past History     Past Medical History:  Past Medical History:   Diagnosis Date    Anxiety 10/20/2010    Headache     Hypercholesterolemia     Hypertension        Past Surgical History:  Past Surgical History:   Procedure Laterality Date    HX GYN      etopic pregnancy       Family History:  Family History   Problem Relation Age of Onset    Breast Cancer Sister     Breast Cancer Sister        Social History:  Social History     Tobacco Use    Smoking status: Never Smoker    Smokeless tobacco: Never Used   Substance Use Topics    Alcohol use: No    Drug use: No       Allergies: Allergies   Allergen Reactions    Latex Other (comments)     Burning Sensation    Banana Swelling    Vibramycin [Doxycycline Calcium] Nausea Only         Review of Systems       Review of Systems   Constitutional: Negative for activity change, fatigue and fever. HENT: Negative for congestion and rhinorrhea. Eyes: Negative for visual disturbance. Respiratory: Positive for chest tightness and shortness of breath. Cardiovascular: Negative for chest pain and palpitations. Gastrointestinal: Negative for abdominal pain, diarrhea, nausea and vomiting. Genitourinary: Negative for dysuria and hematuria. Musculoskeletal: Negative for back pain. Skin: Negative for rash. Neurological: Positive for headaches. Negative for dizziness, weakness and light-headedness. All other systems reviewed and are negative. Physical Exam     Visit Vitals  BP (!) 158/75   Pulse 85   Temp 98.2 °F (36.8 °C)   Resp 15   Ht 5' 6\" (1.676 m)   Wt 77.1 kg (170 lb)   LMP 05/03/2016   SpO2 100%   BMI 27.44 kg/m²         Physical Exam  Vitals signs and nursing note reviewed. Constitutional:       General: She is not in acute distress. Appearance: She is well-developed. Comments: Mildly drowsy   HENT:      Head: Normocephalic and atraumatic. Comments: Nasal packing in place, no drainage, no blood in her mouth     Right Ear: External ear normal.      Left Ear: External ear normal.      Nose: Nose normal.   Eyes:      General: No scleral icterus. Conjunctiva/sclera: Conjunctivae normal.      Pupils: Pupils are equal, round, and reactive to light. Neck:      Musculoskeletal: Normal range of motion and neck supple. Thyroid: No thyromegaly. Vascular: No JVD. Trachea: No tracheal deviation. Cardiovascular:      Rate and Rhythm: Normal rate and regular rhythm. Heart sounds: Normal heart sounds. No murmur. No friction rub. No gallop. Pulmonary:      Effort: Pulmonary effort is normal.      Breath sounds: Normal breath sounds. Chest:      Chest wall: No tenderness. Abdominal:      General: Bowel sounds are normal. There is no distension. Palpations: Abdomen is soft. Tenderness: There is no abdominal tenderness. There is no guarding or rebound. Musculoskeletal: Normal range of motion. General: No tenderness. Lymphadenopathy:      Cervical: No cervical adenopathy. Skin:     General: Skin is warm and dry. Neurological:      Mental Status: She is oriented to person, place, and time. Cranial Nerves: No cranial nerve deficit. Coordination: Coordination normal.      Comments: No sensory loss, moving all 4 extremities, gait not observed   Psychiatric:         Behavior: Behavior normal.         Thought Content: Thought content normal.         Judgment: Judgment normal.           Diagnostic Study Results     Labs -  Recent Results (from the past 12 hour(s))   EKG, 12 LEAD, INITIAL    Collection Time: 03/22/21 12:15 PM   Result Value Ref Range    Ventricular Rate 65 BPM    Atrial Rate 65 BPM    P-R Interval 174 ms    QRS Duration 76 ms    Q-T Interval 392 ms    QTC Calculation (Bezet) 407 ms    Calculated P Axis 78 degrees    Calculated R Axis 64 degrees    Calculated T Axis 59 degrees    Diagnosis       Normal sinus rhythm  Normal ECG  When compared with ECG of 17-MAR-2021 12:31,  No significant change was found  Confirmed by Alexander Gustafson MD, Kalani Singh ([id number]) on 3/22/2021 2:46:01 PM     CBC WITH AUTOMATED DIFF    Collection Time: 03/22/21 12:27 PM   Result Value Ref Range    WBC 8.8 4.6 - 13.2 K/uL    RBC 4.18 (L) 4.20 - 5.30 M/uL    HGB 13.0 12.0 - 16.0 g/dL    HCT 39.7 35.0 - 45.0 %    MCV 95.0 74.0 - 97.0 FL    MCH 31.1 24.0 - 34.0 PG    MCHC 32.7 31.0 - 37.0 g/dL    RDW 13.7 11.6 - 14.5 %    PLATELET 687 327 - 364 K/uL    MPV 10.7 9.2 - 11.8 FL    NEUTROPHILS 90 (H) 40 - 73 %    LYMPHOCYTES 9 (L) 21 - 52 %    MONOCYTES 1 (L) 3 - 10 %    EOSINOPHILS 0 0 - 5 %    BASOPHILS 0 0 - 2 %    ABS. NEUTROPHILS 7.9 1.8 - 8.0 K/UL    ABS. LYMPHOCYTES 0.8 (L) 0.9 - 3.6 K/UL    ABS. MONOCYTES 0.1 0.05 - 1.2 K/UL    ABS. EOSINOPHILS 0.0 0.0 - 0.4 K/UL    ABS.  BASOPHILS 0.0 0.0 - 0.1 K/UL    DF AUTOMATED     METABOLIC PANEL, COMPREHENSIVE    Collection Time: 03/22/21 12:27 PM   Result Value Ref Range    Sodium 141 136 - 145 mmol/L Potassium 3.4 (L) 3.5 - 5.5 mmol/L    Chloride 104 100 - 111 mmol/L    CO2 30 21 - 32 mmol/L    Anion gap 7 3.0 - 18 mmol/L    Glucose 165 (H) 74 - 99 mg/dL    BUN 9 7.0 - 18 MG/DL    Creatinine 0.78 0.6 - 1.3 MG/DL    BUN/Creatinine ratio 12 12 - 20      GFR est AA >60 >60 ml/min/1.73m2    GFR est non-AA >60 >60 ml/min/1.73m2    Calcium 9.2 8.5 - 10.1 MG/DL    Bilirubin, total 0.5 0.2 - 1.0 MG/DL    ALT (SGPT) 28 13 - 56 U/L    AST (SGOT) 16 10 - 38 U/L    Alk. phosphatase 90 45 - 117 U/L    Protein, total 7.4 6.4 - 8.2 g/dL    Albumin 3.9 3.4 - 5.0 g/dL    Globulin 3.5 2.0 - 4.0 g/dL    A-G Ratio 1.1 0.8 - 1.7     TROPONIN I    Collection Time: 03/22/21 12:27 PM   Result Value Ref Range    Troponin-I, QT 0.03 0.0 - 0.045 NG/ML   PROTHROMBIN TIME + INR    Collection Time: 03/22/21 12:27 PM   Result Value Ref Range    Prothrombin time 12.9 11.5 - 15.2 sec    INR 1.0 0.8 - 1.2     PTT    Collection Time: 03/22/21 12:27 PM   Result Value Ref Range    aPTT 30.2 23.0 - 36.4 SEC   NT-PRO BNP    Collection Time: 03/22/21 12:27 PM   Result Value Ref Range    NT pro- 0 - 900 PG/ML   CARDIAC PANEL,(CK, CKMB & TROPONIN)    Collection Time: 03/22/21  2:58 PM   Result Value Ref Range    CK - MB 1.2 <3.6 ng/ml    CK-MB Index 1.1 0.0 - 4.0 %     26 - 192 U/L    Troponin-I, QT 0.03 0.0 - 0.045 NG/ML       Radiologic Studies -   CT CHEST WO CONT   Final Result      1. Minimal bibasilar atelectasis. Mild pulmonary vascular congestion. No acute   airspace infiltration or edema. 2. Borderline cardiomegaly. 3. Small ovoid nodule at medial left breast. Recommend breast ultrasound for a   possible cyst.      Thank you for your referral.       XR CHEST SNGL V   Final Result   1. Hypoinflated lungs. 2.  Mild pulmonary vascular congestion and interstitial edema versus   infiltrates. 3.  Specific scoring density along the inferior T9 endplate, most likely   degenerative in nature although not closely evaluated. Considered thoracic spine   radiographs to further assess. Medical Decision Making   I am the first provider for this patient. I reviewed the vital signs, available nursing notes, past medical history, past surgical history, family history and social history. Vital Signs-Reviewed the patient's vital signs. EKG: EKG at 1230 normal sinus rhythm 63 no STEMI, interpreted by me    EKG #2 normal sinus rhythm at 65 no STEMI, interpreted by me    Records Reviewed: Nursing Notes, Old Medical Records, Previous Radiology Studies and Previous Laboratory Studies (Time of Review: 12:56 PM)    ED Course: Progress Notes, Reevaluation, and Consults:      Extended Emergency Contact Information  Primary Emergency Contact: [address] Phone: [phone]  Mobile Phone: [phone]  Relation: Daughter  Secondary Emergency Contact: Otto Wiggins  Mobile Phone: [phone]  Relation: Sister    The patient has stable troponins some cardiomegaly on her CT scan and potentially some vascular congestion. Given that her blood pressure was elevated and likely she received some IV fluids during the procedure she may be volume overloaded. Give a dose of Lasix and discussed the case with Dr. Carlos A Hartman. Princess Cluadia DO 3:58 PM    Discussed case with Dr. Carlos A Hartman and defers to me regarding admission. Patient has a heart score of 5 and given her episode CT scan showing potential volume overload will give Lasix and plan to admit for further care. Princess Claudia DO 4:14 PM    HEART SCORE:   History: Moderately Suspicious  ECG: Nonspecific repolarization disturbance  Age: Greater than 45 years - Less than 65 years  Risk Factors: Greater than or equal to 3 risk factors, or history of atherosclerotic disease  Troponin: Less than or equal to normal limit   HEART Score Total : 5     Management   Scores 0-3: 0.9-1.7% risk of adverse cardiac event.  In the HEART Score study, these patients were discharged (0.99% in the retrospective study, 1.7% in the prospective study)   Scores 4-6: 12-16.6% risk of adverse cardiac event. In the HEART Score study, these patients were admitted to the hospital. (11.6% retrospective, 16.6% prospective)   Scores ? 7: 50-65% risk of adverse cardiac event. In the HEART Score study, these patients were candidates for early invasive measures. (65.2% retrospective, 50.1% prospective)   A MACE (Major Adverse Cardiac Event) was defined as all-cause mortality, myocardial infarction, or coronary revascularization. Original/Primary Reference  · Duane Nolen Kelder JC. Chest pain in the emergency room: value of the HEART score. Neth Heart J. 2008;16(6):191-6. Validation  · Arjun Woody, Yaneth MARCH, et al. A prospective validation of the HEART score for chest pain patients at the emergency department. Int J Cardiol. 2013;168(3):2153-8. · Arjun Woody, Vince Sim et al. Chest pain in the emergency room: a multicenter validation of the HEART Score. Crit Pathw Cardiol. 2010;9(3):164-9. · Keisha ROSIO, Nereaj RF, Karine MCGOVERN, et al. The HEART Pathway Randomized Controlled Trial One Year Outcomes. Acad Emerg Med. 2018;     Patient had an episode of coffee-ground emesis and suspect this blood from the surgical procedure into her stomach however she is now having some oozing from the right nares and some oozing into her posterior pharynx. Will discuss the bleeding with Dr. Álvaro August. Deisi Butler DO 5:34 PM    Discussed case with Dr. Álvaro August he said that amount of oozing in the first 24 to 48 hours is normal after the lidocaine with epinephrine wears off. He recommends giving some Afrin to both nares and will help with oozing. Patient has foam packing and can be given Afrin on top of that. Deisi Butler DO 6:20 PM      Provider Notes (Medical Decision Making):   MDM  Number of Diagnoses or Management Options  Diagnosis management comments: Patient is a 59-year-old female with a history of hypertension the presents emergency department after developing chest tightness after having sinus surgery and elevated blood pressure requiring IV hydralazine and labetalol. The EKG when she is symptomatic showed no true ischemic changes as it went to the postoperative area to evaluate the patient. In the emergency department patient's pain is resolved and will follow set of cardiac markers, chest x-ray, continue to monitor blood pressure which is now 245 systolic, and reevaluate. Yaritza Burleson,  1:00 PM        Procedures          Diagnosis     Clinical Impression:   1. Acute chest pain    2. Hypervolemia, unspecified hypervolemia type        Disposition: Admit     Follow-up Information    None          Patient's Medications   Start Taking    No medications on file   Continue Taking    BUTALBITAL-ACETAMINOPHEN-CAFFEINE (FIORICET, ESGIC) -40 MG PER TABLET    Take 1 Tab by mouth every four (4) hours as needed for Headache. CETIRIZINE (ZYRTEC) 10 MG TABLET    Take 1 Tab by mouth daily. CLINDAMYCIN-BENZOYL PEROXIDE (BENZACLIN) 1-5 % TOPICAL GEL    Apply  to affected area two (2) times a day. Apply to affected area after the skin has been cleansed and dried. CYCLOBENZAPRINE (FLEXERIL) 5 MG TABLET    Take 1 Tab by mouth nightly. Indications: muscle spasm    FLUTICASONE (FLONASE) 50 MCG/ACTUATION NASAL SPRAY    2 Sprays by Both Nostrils route daily. IBUPROFEN (MOTRIN) 800 MG TABLET    Take 1 Tab by mouth every eight (8) hours as needed for Pain. MONTELUKAST (SINGULAIR) 10 MG TABLET    Take 1 Tab by mouth daily. MULTIVITAMIN WITH IRON-MINERAL 0.8 MG CMPK    Take  by mouth. ONDANSETRON (ZOFRAN ODT) 4 MG DISINTEGRATING TABLET    Take 1 Tab by mouth every eight (8) hours as needed for Nausea. POTASSIUM CHLORIDE (K-DUR, KLOR-CON) 20 MEQ TABLET    Take 1 Tab by mouth daily. TELMISARTAN (MICARDIS) 80 MG TABLET    Take 1 Tab by mouth daily.    These Medications have changed    No medications on file   Stop Taking    No medications on file     Disclaimer: Sections of this note are dictated using utilizing voice recognition software. Minor typographical errors may be present. If questions arise, please do not hesitate to contact me or call our department.

## 2021-03-23 ENCOUNTER — HOSPITAL ENCOUNTER (OUTPATIENT)
Dept: CT IMAGING | Age: 61
Setting detail: OBSERVATION
Discharge: HOME | End: 2021-03-23
Attending: PHYSICIAN ASSISTANT
Payer: COMMERCIAL

## 2021-03-23 ENCOUNTER — APPOINTMENT (OUTPATIENT)
Dept: NON INVASIVE DIAGNOSTICS | Age: 61
End: 2021-03-23
Attending: PHYSICIAN ASSISTANT
Payer: COMMERCIAL

## 2021-03-23 PROCEDURE — 99218 HC RM OBSERVATION: CPT

## 2021-03-23 PROCEDURE — 77030038269 HC DRN EXT URIN PURWCK BARD -A

## 2021-03-23 PROCEDURE — 74011250636 HC RX REV CODE- 250/636: Performed by: EMERGENCY MEDICINE

## 2021-03-23 PROCEDURE — 74011250637 HC RX REV CODE- 250/637: Performed by: PHYSICIAN ASSISTANT

## 2021-03-23 PROCEDURE — 96376 TX/PRO/DX INJ SAME DRUG ADON: CPT

## 2021-03-23 PROCEDURE — 99220 PR INITIAL OBSERVATION CARE/DAY 70 MINUTES: CPT | Performed by: INTERNAL MEDICINE

## 2021-03-23 PROCEDURE — C9113 INJ PANTOPRAZOLE SODIUM, VIA: HCPCS | Performed by: PHYSICIAN ASSISTANT

## 2021-03-23 PROCEDURE — 2709999900 HC NON-CHARGEABLE SUPPLY

## 2021-03-23 PROCEDURE — 72128 CT CHEST SPINE W/O DYE: CPT

## 2021-03-23 PROCEDURE — 96375 TX/PRO/DX INJ NEW DRUG ADDON: CPT

## 2021-03-23 PROCEDURE — 74011250636 HC RX REV CODE- 250/636: Performed by: PHYSICIAN ASSISTANT

## 2021-03-23 PROCEDURE — 99223 1ST HOSP IP/OBS HIGH 75: CPT | Performed by: INTERNAL MEDICINE

## 2021-03-23 RX ORDER — ONDANSETRON 2 MG/ML
4 INJECTION INTRAMUSCULAR; INTRAVENOUS
Status: DISCONTINUED | OUTPATIENT
Start: 2021-03-23 | End: 2021-03-24 | Stop reason: HOSPADM

## 2021-03-23 RX ORDER — PROMETHAZINE HYDROCHLORIDE 25 MG/1
12.5 TABLET ORAL
Status: DISCONTINUED | OUTPATIENT
Start: 2021-03-23 | End: 2021-03-24 | Stop reason: HOSPADM

## 2021-03-23 RX ORDER — METOPROLOL SUCCINATE 25 MG/1
25 TABLET, EXTENDED RELEASE ORAL DAILY
Status: DISCONTINUED | OUTPATIENT
Start: 2021-03-24 | End: 2021-03-24 | Stop reason: HOSPADM

## 2021-03-23 RX ORDER — FLUTICASONE PROPIONATE 50 MCG
2 SPRAY, SUSPENSION (ML) NASAL DAILY
Status: DISCONTINUED | OUTPATIENT
Start: 2021-03-23 | End: 2021-03-24 | Stop reason: HOSPADM

## 2021-03-23 RX ORDER — SODIUM CHLORIDE 0.9 % (FLUSH) 0.9 %
5-40 SYRINGE (ML) INJECTION EVERY 8 HOURS
Status: DISCONTINUED | OUTPATIENT
Start: 2021-03-23 | End: 2021-03-24 | Stop reason: HOSPADM

## 2021-03-23 RX ORDER — PANTOPRAZOLE SODIUM 40 MG/10ML
40 INJECTION, POWDER, LYOPHILIZED, FOR SOLUTION INTRAVENOUS EVERY 12 HOURS
Status: DISCONTINUED | OUTPATIENT
Start: 2021-03-23 | End: 2021-03-24 | Stop reason: HOSPADM

## 2021-03-23 RX ORDER — ATORVASTATIN CALCIUM 40 MG/1
40 TABLET, FILM COATED ORAL
Status: DISCONTINUED | OUTPATIENT
Start: 2021-03-23 | End: 2021-03-24 | Stop reason: HOSPADM

## 2021-03-23 RX ORDER — ACETAMINOPHEN 325 MG/1
650 TABLET ORAL
Status: DISCONTINUED | OUTPATIENT
Start: 2021-03-23 | End: 2021-03-24 | Stop reason: HOSPADM

## 2021-03-23 RX ORDER — BUTALBITAL, ACETAMINOPHEN AND CAFFEINE 300; 40; 50 MG/1; MG/1; MG/1
1 CAPSULE ORAL
Status: DISCONTINUED | OUTPATIENT
Start: 2021-03-23 | End: 2021-03-24 | Stop reason: HOSPADM

## 2021-03-23 RX ORDER — MORPHINE SULFATE 2 MG/ML
2 INJECTION, SOLUTION INTRAMUSCULAR; INTRAVENOUS
Status: COMPLETED | OUTPATIENT
Start: 2021-03-23 | End: 2021-03-23

## 2021-03-23 RX ORDER — SODIUM CHLORIDE 0.9 % (FLUSH) 0.9 %
5-40 SYRINGE (ML) INJECTION AS NEEDED
Status: DISCONTINUED | OUTPATIENT
Start: 2021-03-23 | End: 2021-03-24 | Stop reason: HOSPADM

## 2021-03-23 RX ORDER — ACETAMINOPHEN 650 MG/1
650 SUPPOSITORY RECTAL
Status: DISCONTINUED | OUTPATIENT
Start: 2021-03-23 | End: 2021-03-24 | Stop reason: HOSPADM

## 2021-03-23 RX ORDER — PANTOPRAZOLE SODIUM 40 MG/1
40 TABLET, DELAYED RELEASE ORAL 2 TIMES DAILY
Status: DISCONTINUED | OUTPATIENT
Start: 2021-03-23 | End: 2021-03-23

## 2021-03-23 RX ORDER — TELMISARTAN 40 MG/1
80 TABLET ORAL DAILY
Status: DISCONTINUED | OUTPATIENT
Start: 2021-03-23 | End: 2021-03-24 | Stop reason: HOSPADM

## 2021-03-23 RX ORDER — LORATADINE 10 MG/1
10 TABLET ORAL DAILY
Status: DISCONTINUED | OUTPATIENT
Start: 2021-03-23 | End: 2021-03-24 | Stop reason: HOSPADM

## 2021-03-23 RX ORDER — POLYETHYLENE GLYCOL 3350 17 G/17G
17 POWDER, FOR SOLUTION ORAL DAILY PRN
Status: DISCONTINUED | OUTPATIENT
Start: 2021-03-23 | End: 2021-03-24 | Stop reason: HOSPADM

## 2021-03-23 RX ORDER — MONTELUKAST SODIUM 10 MG/1
10 TABLET ORAL DAILY
Status: DISCONTINUED | OUTPATIENT
Start: 2021-03-23 | End: 2021-03-24 | Stop reason: HOSPADM

## 2021-03-23 RX ORDER — MORPHINE SULFATE 4 MG/ML
4 INJECTION INTRAVENOUS ONCE
Status: COMPLETED | OUTPATIENT
Start: 2021-03-23 | End: 2021-03-23

## 2021-03-23 RX ADMIN — MORPHINE SULFATE 2 MG: 2 INJECTION, SOLUTION INTRAMUSCULAR; INTRAVENOUS at 06:36

## 2021-03-23 RX ADMIN — MORPHINE SULFATE 4 MG: 4 INJECTION, SOLUTION INTRAMUSCULAR; INTRAVENOUS at 03:52

## 2021-03-23 RX ADMIN — ONDANSETRON 4 MG: 2 INJECTION INTRAMUSCULAR; INTRAVENOUS at 21:21

## 2021-03-23 RX ADMIN — PANTOPRAZOLE SODIUM 40 MG: 40 INJECTION, POWDER, FOR SOLUTION INTRAVENOUS at 21:00

## 2021-03-23 RX ADMIN — ATORVASTATIN CALCIUM 40 MG: 40 TABLET, FILM COATED ORAL at 21:20

## 2021-03-23 RX ADMIN — Medication 10 ML: at 21:30

## 2021-03-23 RX ADMIN — Medication 10 ML: at 15:13

## 2021-03-23 RX ADMIN — LORATADINE 10 MG: 10 TABLET ORAL at 13:24

## 2021-03-23 RX ADMIN — ACETAMINOPHEN 650 MG: 325 TABLET ORAL at 11:13

## 2021-03-23 RX ADMIN — PANTOPRAZOLE SODIUM 40 MG: 40 TABLET, DELAYED RELEASE ORAL at 13:24

## 2021-03-23 RX ADMIN — PANTOPRAZOLE SODIUM 40 MG: 40 INJECTION, POWDER, FOR SOLUTION INTRAVENOUS at 15:11

## 2021-03-23 RX ADMIN — TELMISARTAN 80 MG: 40 TABLET ORAL at 13:24

## 2021-03-23 RX ADMIN — MONTELUKAST 10 MG: 10 TABLET, FILM COATED ORAL at 13:24

## 2021-03-23 RX ADMIN — BUTALBITAL, ACETAMINOPHEN, AND CAFFEINE CAPSULES 1 CAPSULE: 50; 300; 40 CAPSULE ORAL at 21:21

## 2021-03-23 NOTE — ROUTINE PROCESS
Bedside shift change report given to Farida BROWN (oncoming nurse) by [facility] LPN (offgoing nurse). Report included the following information SBAR, Kardex, ED Summary, Procedure Summary, Intake/Output, MAR and Recent Results.

## 2021-03-23 NOTE — PROGRESS NOTES
Per pt, [place] [address] [facility], [address] and the address on file is her mailing address.         ERIKA Lorenz RN  Care Management  Pager: [phone]

## 2021-03-23 NOTE — CONSULTS
[email or web address]  [phone]    GASTROENTEROLOGY CONSULT      Impression:   1. Emesis of ingested blood related to post sinus surgery and septoplasty and unlikely GI bleed   - H/H 13.0; Hct 39.7  2. S/p sinus surgery and septoplasty done by Dr. Rivero Members on 3/22/21 due to chronic sinusitis  3. Epistaxis related to septoplasty   -  nasal gauze in right nare; intermittently spits maroon color saliva indicative of posterior nasal bleeding      Plan:     1. No indication for endoscopic evaluation of upper GI tract. 2. No NSAIDs/ASA  3. Medical management otherwise per primary team & ENT. Will sign off, but remain available as needed. Chief Complaint: emesis of ingested blood      HPI:  Jennifer Wolff is a 64 y.o. female who I am being asked to see in consultation for an opinion regarding emesis of ingested blood post septoplasty by Dr Rosalba Thompson, ENT yesterday. Patient was initially seen in ED post surgery following \"coffee-ground emesis\" and \"oozing blood from right nare and posterior pharynx\". Per ED chart review\" Discussed case with Dr. Rosalba Thompson he said that amount of oozing in the first 24 to 48 hours is normal after the lidocaine with epinephrine wears off. He recommends giving some Afrin to both nares and will help with oozing. Patient has foam packing and can be given Afrin on top of that. \" She was subsequently admit for management of acute chest pain without identifiable ischemic changes on ECG. She had more episodes of vomiting blood while in the ED again with suspicion that etiology was related to bleeding septoplasty. Nausea and emesis recurred during nuclear stress test today that was then aborted. Cardiologist does not suspect ACS etiology. Seen this afternoon, notes no recurrent emesis and that with side laying, she has been able to control ingesting blood unlike if she lays supine. Denies use of NSAIDs, ASA or AC.  Notes that she primarily abstains from medications where possible and has preference for herbal remedies. No heartburn, acid reflux, abdominal pain. Notes that chest pain and headache are gone. She is concerned about potentially having a blood clot in right nare.      PMH:   Past Medical History:   Diagnosis Date    Anxiety 10/20/2010    Headache     Hypercholesterolemia     Hypertension        PSH:   Past Surgical History:   Procedure Laterality Date    HX GYN      etopic pregnancy       Social HX:   Social History     Socioeconomic History    Marital status: SINGLE     Spouse name: Not on file    Number of children: Not on file    Years of education: Not on file    Highest education level: Not on file   Occupational History    Not on file   Social Needs    Financial resource strain: Not on file    Food insecurity     Worry: Not on file     Inability: Not on file    Transportation needs     Medical: Not on file     Non-medical: Not on file   Tobacco Use    Smoking status: Never Smoker    Smokeless tobacco: Never Used   Substance and Sexual Activity    Alcohol use: No    Drug use: No    Sexual activity: Yes     Partners: Male   Lifestyle    Physical activity     Days per week: Not on file     Minutes per session: Not on file    Stress: Not on file   Relationships    Social connections     Talks on phone: Not on file     Gets together: Not on file     Attends Anabaptist service: Not on file     Active member of club or organization: Not on file     Attends meetings of clubs or organizations: Not on file     Relationship status: Not on file    Intimate partner violence     Fear of current or ex partner: Not on file     Emotionally abused: Not on file     Physically abused: Not on file     Forced sexual activity: Not on file   Other Topics Concern    Not on file   Social History Narrative    Not on file       FHX:   Family History   Problem Relation Age of Onset    Breast Cancer Sister     Breast Cancer Sister        Allergy:   Allergies   Allergen Reactions  Latex Other (comments)     Burning Sensation    Banana Swelling    Vibramycin [Doxycycline Calcium] Nausea Only       Patient Active Problem List   Diagnosis Code    Anxiety F41.9    AR (allergic rhinitis) J30.9    Acute bronchitis J20.9    Cough R05    Nasal congestion R09.81    Nausea R11.0    Acute chest pain R07.9       Home Medications:     Medications Prior to Admission   Medication Sig    telmisartan (MICARDIS) 80 mg tablet Take 1 Tab by mouth daily.  ibuprofen (MOTRIN) 800 mg tablet Take 1 Tab by mouth every eight (8) hours as needed for Pain.  cyclobenzaprine (FLEXERIL) 5 mg tablet Take 1 Tab by mouth nightly. Indications: muscle spasm    butalbital-acetaminophen-caffeine (FIORICET, ESGIC) -40 mg per tablet Take 1 Tab by mouth every four (4) hours as needed for Headache.  fluticasone (FLONASE) 50 mcg/actuation nasal spray 2 Sprays by Both Nostrils route daily.  montelukast (SINGULAIR) 10 mg tablet Take 1 Tab by mouth daily.  potassium chloride (K-DUR, KLOR-CON) 20 mEq tablet Take 1 Tab by mouth daily.  cetirizine (ZYRTEC) 10 mg tablet Take 1 Tab by mouth daily.  ondansetron (ZOFRAN ODT) 4 mg disintegrating tablet Take 1 Tab by mouth every eight (8) hours as needed for Nausea.  clindamycin-benzoyl peroxide (BENZACLIN) 1-5 % topical gel Apply  to affected area two (2) times a day. Apply to affected area after the skin has been cleansed and dried.  multivitamin with iron-mineral 0.8 mg Cmpk Take  by mouth. Review of Systems:     Constitutional: No fever, chills, weight loss, fatigue. Skin: No rashes, pruritis, jaundice, ulcerations, erythema. HENT: mild headaches, + nosebleed. Eyes: No visual changes, blurred vision, eye pain, photophobia, jaundice. Cardiovascular: mild chest pain, no heart palpitations.    Respiratory: No cough, shortness of breath   Gastrointestinal: No abdominal pain, vomiting, nausea, heartburn/acid refluc   Genitourinary: No dysuria, bleeding, discharge, pyuria. Musculoskeletal: No weakness, arthralgias, wasting. Endo: No sweats. Heme: No bruising, easy bleeding. Allergies: As noted. Neurological: Alert and oriented. Gait not assessed. Psychiatric:  No anxiety, depression, hallucinations. Visit Vitals  /65   Pulse 67   Temp 100.1 °F (37.8 °C)   Resp 18   Ht 5' 6\" (1.676 m)   Wt 77.1 kg (170 lb)   LMP 05/03/2016   SpO2 98%   Breastfeeding No   BMI 27.44 kg/m²       Physical Assessment:     constitutional: appearance: well developed, no deformities, in no acute distress. skin: inspection: no rashes, ulcers, icterus. eyes: inspection: normal conjunctivae and lids; no jaundice   ENMT: nasal packing with gauze right nare; wearing mask due to COVID-19 pandemic; intermittently spits up maroon color saliva. neck: thyroid: normal size, consistency and position; no masses or tenderness. respiratory: effort: normal chest excursion; no intercostal retraction or accessory muscle use. cardiovascular: normal rate/rhythm. abdominal: abdomen: normal consistency; no tenderness or masses. hernias: no hernias appreciated. liver: normal size and consistency. spleen: not palpable. rectal: hemoccult/guaiac: not performed. musculoskeletal: no pain, deformity or contracture. neurologic: grossly intact by observation  psychiatric: judgement/insight: within normal limits. memory: within normal limits for recent and remote events. mood and affect: no evidence of depression, anxiety or agitation. orientation: oriented to time, space and person.         Basic Metabolic Profile   Recent Labs     03/22/21  1227      K 3.4*      CO2 30   BUN 9   *   CA 9.2         CBC w/Diff    Recent Labs     03/22/21  1227   WBC 8.8   RBC 4.18*   HGB 13.0   HCT 39.7   MCV 95.0   MCH 31.1   MCHC 32.7   RDW 13.7       Recent Labs     03/22/21  1227   GRANS 90*   LYMPH 9*   EOS 0        Hepatic Function   Recent Labs 03/22/21  1227   ALB 3.9   TP 7.4   TBILI 0.5   AP 90        Coags   Recent Labs     03/22/21  1227   PTP 12.9   INR 1.0   APTT 30.2           PORTILLO Vasquez. [facility], [address] [place] [address]  Cell: [phone]  [email or web address]

## 2021-03-23 NOTE — CONSULTS
[facility] - Consult Note    Consultation request by Cherelle Buck PA-C for advice/opinion related to evaluating chest pain    Date of  Admission: 3/22/2021 12:12 PM   Primary Care Physician:  Ursula Cogan, MD      I saw, evaluated, interviewed and examined the patient personally. I agree with the findings and plan of care as documented below with PARVEEN note  Patient admitted with atypical sounding chest discomfort after having ENT, nasal surgery for sinusitis  EKG without any ST changes of ischemia. Cardiac biomarker is normal  Echocardiogram ordered. Patient was still ongoing nosebleed and coughing up blood likely from recent surgery. Once this acute issue resolves, can consider coronary noninvasive evaluation as outpatient  Further plan depending on echo finding    Boaz Whitehead MD        Assessment:     -Chest pain, negative cardiac enzymes x 3, normal EKG. -HTN, on Telmisartan  -Hyperlipidemia, lipid panel from 09/2017 with Chol 204, HDL 72, .8, TG 56.    - Chronic sinusitis    No primary cardiologist     Plan:     -Will repeat labs this morning, checking lipid panel as well.  -Agree with obtaining Echo as ordered by primary team.  -Further recommendations to follow based on test results, hospital course. Addendum: Nuclear stress test was ordered for completeness today, do not suspect ACS as cause of chest discomfort given negative cardiac enzymes x 3, normal EKG. Okay to defer stress test to outpatient setting, will cancel order. History of Present Illness: This is a 64 y.o. female admitted for Acute chest pain [R07.9]. Patient complains of:  Chest pressure    Katt Fitzpatrick is a 64 y.o. female with PMHx as described above, who presented to the hospital due to chest pressure that started as she was coming out of anesthesia s/p ENT procedure yesterday.   She states the chest pressure was initially rated as 10/10, has been constant but much improved today, currently rated as 1/10. She denies any radiation of her chest pain. She states her chest pressure is improved as she is laying down and relaxing. She denies recent c/o exertional chest pain/discomfort or shortness of breath. She reports shortness of breath, which she relates to her sinus congestion, noting that she has to breathe out of her mouth, and nausea as well, which she states is intermittent and occurs when her congestion hits her stomach. She reports she had a stress test years ago at the [address], denies cardiac history. Denies FHx premature CAD. Non-smoker. Only takes medication for HTN and vitamins. Cardiac risk factors: hypertension      Review of Symptoms:  Except as stated above include:  Constitutional:  Negative  ENT: + sinus congestion  Respiratory:  + shortness of breath  Cardiovascular:  As per HPI  Gastrointestinal: + nausea  Genitourinary:  negative  Musculoskeletal:  Negative  Neurological:  + headache  Dermatological:  Negative  Endocrinological: Negative  Psychological:  Negative       Past Medical History:     Past Medical History:   Diagnosis Date    Anxiety 10/20/2010    Headache     Hypercholesterolemia     Hypertension          Social History:     Social History     Socioeconomic History    Marital status: SINGLE     Spouse name: Not on file    Number of children: Not on file    Years of education: Not on file    Highest education level: Not on file   Tobacco Use    Smoking status: Never Smoker    Smokeless tobacco: Never Used   Substance and Sexual Activity    Alcohol use: No    Drug use: No    Sexual activity: Yes     Partners: Male        Family History:     Family History   Problem Relation Age of Onset    Breast Cancer Sister     Breast Cancer Sister         Medications:      Allergies   Allergen Reactions    Latex Other (comments)     Burning Sensation    Banana Swelling    Vibramycin [Doxycycline Calcium] Nausea Only        Current Facility-Administered Medications   Medication Dose Route Frequency    butalbital-acetaminophen-caffeine (FIORICET, ESGIC) -40 mg per tablet 1 Tab  1 Tab Oral Q4H PRN    loratadine (CLARITIN) tablet 10 mg  10 mg Oral DAILY    fluticasone propionate (FLONASE) 50 mcg/actuation nasal spray 2 Spray  2 Spray Both Nostrils DAILY    montelukast (SINGULAIR) tablet 10 mg  10 mg Oral DAILY    telmisartan (MICARDIS) tablet 80 mg  80 mg Oral DAILY    sodium chloride (NS) flush 5-40 mL  5-40 mL IntraVENous Q8H    sodium chloride (NS) flush 5-40 mL  5-40 mL IntraVENous PRN    acetaminophen (TYLENOL) tablet 650 mg  650 mg Oral Q6H PRN    Or    acetaminophen (TYLENOL) suppository 650 mg  650 mg Rectal Q6H PRN    polyethylene glycol (MIRALAX) packet 17 g  17 g Oral DAILY PRN    promethazine (PHENERGAN) tablet 12.5 mg  12.5 mg Oral Q6H PRN    Or    ondansetron (ZOFRAN) injection 4 mg  4 mg IntraVENous Q6H PRN         Physical Exam:     Visit Vitals  BP (!) 151/70   Pulse 65   Temp 99.2 °F (37.3 °C)   Resp 16   Ht 5' 6\" (1.676 m)   Wt 77.1 kg (170 lb)   SpO2 94%   Breastfeeding No   BMI 27.44 kg/m²     BP Readings from Last 3 Encounters:   03/23/21 (!) 151/70   03/12/20 150/85   07/10/19 (!) 160/94     Pulse Readings from Last 3 Encounters:   03/23/21 65   03/12/20 69   07/10/19 64     Wt Readings from Last 3 Encounters:   03/22/21 77.1 kg (170 lb)   03/12/20 76.2 kg (168 lb)   07/10/19 71.7 kg (158 lb)       General:  alert, cooperative, no distress, appears stated age  ENT: packing noted to nares  Neck:  supple  Lungs:  clear to auscultation bilaterally  Heart:  Regular rate and rhythm  Abdomen:  abdomen is soft without significant tenderness, masses, organomegaly or guarding  Extremities:  Atraumatic, no edema  Skin: Warm and dry.    Neuro: alert, oriented x3, affect appropriate, no focal neurological deficits, moves all extremities well, no involuntary movements  Psych: non focal     Data Review:     Recent Labs 03/22/21  1227   WBC 8.8   HGB 13.0   HCT 39.7        Recent Labs     03/22/21  1227      K 3.4*      CO2 30   *   BUN 9   CREA 0.78   CA 9.2   ALB 3.9   ALT 28   INR 1.0       Results for orders placed or performed during the hospital encounter of 03/22/21   EKG, 12 LEAD, INITIAL   Result Value Ref Range    Ventricular Rate 65 BPM    Atrial Rate 65 BPM    P-R Interval 174 ms    QRS Duration 76 ms    Q-T Interval 392 ms    QTC Calculation (Bezet) 407 ms    Calculated P Axis 78 degrees    Calculated R Axis 64 degrees    Calculated T Axis 59 degrees    Diagnosis       Normal sinus rhythm  Normal ECG  When compared with ECG of 17-MAR-2021 12:31,  No significant change was found  Confirmed by Adelia Palomo MD, Maki Gorman ([id number]) on 3/22/2021 2:46:01 PM         All Cardiac Markers in the last 24 hours:    Lab Results   Component Value Date/Time    CPK 68 03/22/2021 09:58 PM     03/22/2021 02:58 PM    CKMB 1.0 03/22/2021 09:58 PM    CKMB 1.2 03/22/2021 02:58 PM    CKND1 1.5 03/22/2021 09:58 PM    CKND1 1.1 03/22/2021 02:58 PM    TROIQ 0.03 03/22/2021 09:58 PM    TROIQ 0.03 03/22/2021 02:58 PM    TROIQ 0.03 03/22/2021 12:27 PM       Last Lipid:    Lab Results   Component Value Date/Time    Cholesterol, total 204 (H) 09/27/2017 09:00 AM    HDL Cholesterol 72 (H) 09/27/2017 09:00 AM    LDL, calculated 120.8 (H) 09/27/2017 09:00 AM    Triglyceride 56 09/27/2017 09:00 AM    CHOL/HDL Ratio 2.8 09/27/2017 09:00 AM       Signed By: Dorita Giraldo PA-C     March 23, 2021

## 2021-03-23 NOTE — ED NOTES
Daughter notified of mother's admission to [facility] room 460. Update given per ok from Ms. Karla Martinez. Denies questions.

## 2021-03-23 NOTE — PROGRESS NOTES
conducted an initial consultation and Spiritual Assessment for Rodger Ortiz, who is a 64 y.o.,female. Patient's Primary Language is: [place]. According to the patient's EMR Sabianist Affiliation is: No preference. The reason the Patient came to the hospital is:   Patient Active Problem List    Diagnosis Date Noted    Acute chest pain 03/22/2021    Acute bronchitis 01/25/2018    Cough 01/25/2018    Nasal congestion 01/25/2018    Nausea 01/25/2018    AR (allergic rhinitis) 03/20/2012    Anxiety 10/20/2010        The  provided the following Interventions:  Visited Ms. Andre Orozco during my daily rounds. Upon arrival she was sleepy but after my introduction she requested prayer. Initiated a relationship of care and support. Listened empathically. We prayed for peace, clarity, comfort and strength. Provided information about Spiritual Care Services. The following outcomes where achieved:  Patient processed feeling about current hospitalization. Patient expressed gratitude for 's visit. Plan:  Chaplains will continue to follow and will provide pastoral care on an as needed/requested basis.  recommends bedside caregivers page  on duty if patient shows signs of acute spiritual or emotional distress.     [address]   [phone]

## 2021-03-23 NOTE — PROGRESS NOTES
Reason for Admission:  Acute chest pain [R07.9]                 RUR Score:    N/A         Plan for utilizing home health:    To be determined                    Likelihood of Readmission:   LOW                         Transition of Care Plan:              Initial assessment completed with patient. Cognitive status of patient: oriented to time, place, person and situation.     Face sheet information confirmed:  yes.  The patient designates her daughter Anjel Renae and sister Julisa Waite to participate in her discharge plan and to receive any needed information. This patient lives in a home with sister.  Patient is able to navigate steps as needed.  Prior to hospitalization, patient was considered to be independent with ADLs/IADLS : yes .     Patient has a current ACP document on file: no      Healthcare Decision Maker:     Click here to complete HealthCare Decision Makers including selection of the Healthcare Decision Maker Relationship (ie \"Primary\")    The daughter will be available to transport patient home upon discharge.   The patient already has none reported medical equipment available in the home.     Patient is not currently active with home health.  Patient has not stayed in a skilled nursing facility or rehab.  Was  stay within last 60 days : no.       Currently, the discharge plan is to be determined.    The patient states that she can obtain her medications from the pharmacy, and take her medications as directed.    Patient's current insurance is [employer]  PCP Verified by CM: Yes  Mode of Transport at Discharge: Other (see comment)(family)  Transition of Care Consult (CM Consult): Discharge Planning  Current Support Network: Other(lives with daughter)  Confirm Follow Up Transport: Self  Discharge Location  Discharge Placement: Unable to determine at this time        ERIKA Staples RN  Care Management  Pager: [phone]

## 2021-03-23 NOTE — H&P
Hospitalist Admission History and Physical    NAME:  Nicolas Epps   :   1960   MRN:   [id number]     PCP:  Ricardo Pace MD  Date/Time:  3/23/2021 8:47 AM  Subjective:   CHIEF COMPLAINT:  Chest pain     HISTORY OF PRESENT ILLNESS:     Ms. Zuleyma Sauceda is a 63 yo AA woman with hx of HTN and sinusitis  who came to  [facility] ED with chest pain. The patient had planned outpatient procedure with Dr Dave Piedra yesterday at [facility]-  endoscopic sinus surgery and septoplasty per ED note- for chronic sinusitis disease. She was taken to recovery room and complained of non radiating chest pain and SOB with associated  nausea. She was then taken to [facility] ED for further eval.     In the ED- the patient was having hematemesis and nosebleed. Per ed note-   \"Discussed case with Dr. Yumiko Hebert he said that amount of oozing in the first 24 to 48 hours is normal after the lidocaine with epinephrine wears off. He recommends giving some Afrin to both nares and will help with oozing. Patient has foam packing and can be given Afrin on top of that. \"     Patient states she has been having hematemesis since Friday. When I went back to talk to patient, she said she got her days mixed up (because she has been working so much)  and now she denies hematemesis since Friday, it started since after surgery yesterday. She denies any nsaids nor AC nor hx of GIB. The patient was going to get a nuclear stress test this am but it was canceled due to hematemesis. I spoke to the nuclear stress tech who said she saw streaks of blood, not large volume. ED course:   Vital signs: 98.2 F, HR 73, /77, RR 15, 99% RA   Labs remarkable for: K 3.4, troponin 0.04, probnp 167   Imaging:   Chest xray   1. Hypoinflated lungs. 2.  Mild pulmonary vascular congestion and interstitial edema versus infiltrates. 3.  Specific scoring density along the inferior T9 endplate, most likely  degenerative in nature although not closely evaluated.  Considered thoracic spine  radiographs to further assess. CT chest   1. Minimal bibasilar atelectasis. Mild pulmonary vascular congestion. No acute  airspace infiltration or edema. 2. Borderline cardiomegaly. 3. Small ovoid nodule at medial left breast. Recommend breast ultrasound for a  possible cyst.  EKG: NSR HR 65   Medications received lasix 20 mg IV, norco, morphine , zofran, afrin, potassium 40 meq   Procedures performed:        REVIEW OF SYSTEMS:     [] Unable to obtain  ROS due to  []mental status change  []sedated   []intubated   [x]Total of 12 systems reviewed as follows:  Review of Systems  Constitutional: Patient denies: fever, chills, weight loss  HEENT: Patient denies: change in vision, change in hearing, rhinorrhea, sinus congestion, neck pain/stiffness, sore throat, tongue swelling, lip swelling   PULMONARY: Patient denies:  dyspnea on exertion, cough, wheezing  Cardiovascular: Patient denies, palpitations, paroxysmal nocturnal dyspnea, orthopnea, lower extremity edema   GASTROINTESTINAL: Patient denies: abdominal pain, nausea, vomiting, diarrhea, constipation, melena, bright red blood per rectum. GENITOURINARY: Patient denies: urinary frequency, urgency, dysuria, hematuria  MUSCULOSKELETAL: No joint or muscle pain, no back pain, no muscle weakness, no recent trauma. DERMATOLOGIC: No rash, no itching, no lesions. ENDOCRINE: No polyuria, polydipsia, no heat or cold intolerance. No recent change in weight. HEMATOLOGICAL: No anemia or easy bruising or bleeding. NEUROLOGIC: No headache, seizures, numbness, tingling or weakness. PSY: No anxiety nor depression   Pertinent Positives include : chest pain, shortness of breath at rest, hematemesis       # Past medical history: see above in HPI  # Past surgical history: none recently   # Family history:   # Medications: I have reviewed medications with patient   # Allergies: none per patient report   # Social history: patient lives with daughter.   patient ambulates without assistive device. Patient denies recreational drugs, alcohol, tobacco. She works as an . # Specialists: Dr Pao Huggins       Past Medical History:   Diagnosis Date    Anxiety 10/20/2010    Headache     Hypercholesterolemia     Hypertension         Past Surgical History:   Procedure Laterality Date    HX GYN      etopic pregnancy       Social History     Tobacco Use    Smoking status: Never Smoker    Smokeless tobacco: Never Used   Substance Use Topics    Alcohol use: No        Family History   Problem Relation Age of Onset    Breast Cancer Sister     Breast Cancer Sister         Allergies   Allergen Reactions    Latex Other (comments)     Burning Sensation    Banana Swelling    Vibramycin [Doxycycline Calcium] Nausea Only        Prior to Admission Medications   Prescriptions Last Dose Informant Patient Reported? Taking? butalbital-acetaminophen-caffeine (FIORICET, ESGIC) -40 mg per tablet   No No   Sig: Take 1 Tab by mouth every four (4) hours as needed for Headache. cetirizine (ZYRTEC) 10 mg tablet   No No   Sig: Take 1 Tab by mouth daily. clindamycin-benzoyl peroxide (BENZACLIN) 1-5 % topical gel   No No   Sig: Apply  to affected area two (2) times a day. Apply to affected area after the skin has been cleansed and dried. cyclobenzaprine (FLEXERIL) 5 mg tablet   No No   Sig: Take 1 Tab by mouth nightly. Indications: muscle spasm   fluticasone (FLONASE) 50 mcg/actuation nasal spray   No No   Si Sprays by Both Nostrils route daily. ibuprofen (MOTRIN) 800 mg tablet   No No   Sig: Take 1 Tab by mouth every eight (8) hours as needed for Pain.   montelukast (SINGULAIR) 10 mg tablet   No No   Sig: Take 1 Tab by mouth daily. multivitamin with iron-mineral 0.8 mg Cmpk   Yes No   Sig: Take  by mouth. ondansetron (ZOFRAN ODT) 4 mg disintegrating tablet   No No   Sig: Take 1 Tab by mouth every eight (8) hours as needed for Nausea.    potassium chloride (K-DUR, KLOR-CON) 20 mEq tablet   No No   Sig: Take 1 Tab by mouth daily. telmisartan (MICARDIS) 80 mg tablet   No No   Sig: Take 1 Tab by mouth daily. Facility-Administered Medications: None       Objective:   VITALS:    Visit Vitals  BP (!) 151/70   Pulse 65   Temp 99.2 °F (37.3 °C)   Resp 16   Ht 5' 6\" (1.676 m)   Wt 77.1 kg (170 lb)   LMP 2016   SpO2 94%   BMI 27.44 kg/m²     Temp (24hrs), Av.7 °F (37.1 °C), Min:98.2 °F (36.8 °C), Max:99.2 °F (37.3 °C)      PHYSICAL EXAM:   General:    AA woman laying in bed, no acute distress, appears stated age. Head:   Normocephalic, without obvious abnormality  Eyes:   Conjunctivae clear, anicteric sclerae, pupils are equal  Nose:  Nares normal, no drainage   Throat:    Lips, mucosa, and tongue normal.    Neck:  Supple, symmetrical, no JVD. Back:    No CVA tenderness. Lungs: On room air, speaking in complete sentences, clear to auscultation bilaterally- no wheezing, rhonchi or rales  Chest wall:  No tenderness or deformity. No Accessory muscle use. Heart:   Regular rate and rhythm;  no murmur, rub or gallop. Abdomen:   Soft, non-tender. Not distended. Bowel sounds normal. No masses  Extremities: No LE edema. Skin:     No rashes or lesions. Not Jaundiced  Psych:  Good insight. Not depressed, anxious or agitated. Neurologic: Alert and oriented x3. No facial asymmetry. No aphasia or slurred speech. Normal strength. Moving all extremities. Nasal packing noted. She coughed up dark blood clot.      LAB DATA REVIEWED:    No components found for: [place]  Recent Labs     21  1227      K 3.4*      CO2 30   BUN 9   CREA 0.78   *   CA 9.2   ALB 3.9   WBC 8.8   HGB 13.0   HCT 39.7          IMAGING RESULTS:   []  I have personally reviewed the actual   []CXR  []CT scan    CXR:    XR Results (most recent):  Results from Hospital Encounter encounter on 21   XR CHEST SNGL V    Narrative EXAM: XR CHEST SNGL V    INDICATION: 61 years Female. CP.  ADDITIONAL HISTORY: None. TECHNIQUE: Frontal view of the chest.    COMPARISON: No comparison study is available at the time of this dictation. FINDINGS:    Hypoinflated lungs. The cardiac silhouette is within normal limits in size. Mild fullness of central  pulmonary vasculature. There is also mild diffuse prominence of interstitial  markings. No confluent airspace opacity is appreciated. No definite evidence of pleural effusion or pneumothorax. No acute osseous abnormality appreciated. Sclerotic density along the T9  inferior endplate, not closely evaluated on the present study although thought  to likely reflect Modic type 3 degenerative endplate changes. Impression 1. Hypoinflated lungs. 2.  Mild pulmonary vascular congestion and interstitial edema versus  infiltrates. 3.  Specific scoring density along the inferior T9 endplate, most likely  degenerative in nature although not closely evaluated. Considered thoracic spine  radiographs to further assess. CT :   CT Results (most recent):  Results from Hospital Encounter encounter on 03/22/21   CT CHEST WO CONT    Narrative CT chest without contrast     HISTORY: Chest pain and cough after surgical procedure this a.m. COMPARISON: Chest portable view earlier today at 1234 hours    TECHNIQUE: Helical scans through the chest is obtained from the thoracic inlet  to the diaphragm without IV contrast administration. All CT scans at this facility performed using dose optimization techniques as  appreciated to a performed exam, to include automated exposure control,  adjustment of the mA and or KU according to patient size (including appropriate  matching for site specific examination), or use of iterative reconstruction  technique. FINDINGS: The study is suboptimal due to lack of IV contrast.  Subtle  abnormality can be under detected. Lung Parenchyma: Mild respiration motion artifact.  Minimal bibasilar  atelectasis. There is mild pulmonary vascular congestion. No interstitial edema  or airspace infiltration or consolidation seen. Patent airway. Thyroid: Unremarkable. Mediastinum: No adenopathy. Pleural Space And Chest Wall: No significant pleural pathology. Multiple tiny  bilateral axillary lymph nodes without adenopathy. There is a 10 x 15 mm at  medial aspect of left breast. Recommend breast ultrasound for possible potential  cyst.    Heart: There is borderline cardiac enlargement. No pericardial effusion. Vasculature: The aorta and the great vessels are unremarkable. Imaged Upper Abdomen: Small hiatal hernia. Darlynn Magic    Osseous Structures: Unremarkable for age. Special attention directed to the mid  thoracic spine and shows no correlating sclerotic lesion as seen on recent chest  radiograph. Impression 1. Minimal bibasilar atelectasis. Mild pulmonary vascular congestion. No acute  airspace infiltration or edema. 2. Borderline cardiomegaly. 3. Small ovoid nodule at medial left breast. Recommend breast ultrasound for a  possible cyst.    Thank you for your referral.          Assessment/Plan:      Active Problems:    Acute chest pain (3/22/2021)         Consults: CARDIOLOGY     hgb stable at 13.0     1. Low grade fever   2. Chest pain, troponin negative x3   3. Mild pulmonary vascular congestion - this could be related to IVFs she got during surgery on 3/22 , no hx of CHF,  probnp within normal limits , no evidence of volume overload on physical exam.   4. Epistaxis - related to recent surgery on 3/22   5. Hematemesis s/p surgery - suspect related to  ingested blood rather than GIB. 6. Sinusitis s/p endoscopic sinus surgery and septoplasty with Dr Edita Acosta on 3/22   7. T9 density- incidental finding on cxr   8. Medial left breast nodule   9. Mild hypokalemia   10. HTN - on micardis   11. Vitamin d deficient   12. Allergic rhinitis      - cardiology consulted, obtain echo.  monitor on telemetry. Check lipid panell and a1c . There was plan  for nuclear stress test today but it was canceled because of an episode of hematemesis according to nuclear tech. Started on BB.   - GI consult for hematemesis , stat h and h. Transfuse per protocol. PPI IV BID.   - case discussed with Dr Kylah Larson. - monitor temp and wbc count, hold off on abxs for now. - s/p lasix in the ED, stable on room air. Monitor resp status and volume status. - obtain ultrasound left breast and Ct  imaging thoracic spine non urgently   - resume home micardis   - incentive alaina. - encourage OOB     Case discussed with RN as well as cardiology team, GI team and ENT Dr Kylah Larson.      Code status: full code   DVT/GI prophylaxis:  scds   Diet: npo   Disposition: tbd   __________________________________________________  Risk of deterioration:  []Low    [x]Moderate  []High    Care Plan discussed with:    [x]Patient   []Family    []ED Care Manager  []ED Doc   [x]Specialist :    Total Time Coordinating Admission:  60    minutes    []Total Critical Care Time:     ___________________________________________________  Admitting Physician: PORTILLO Donis

## 2021-03-23 NOTE — ED NOTES
Called to bedside. Pt vomited approx 100 bloody fluid. Drip pad changed with large clot expelled from left nare. Pt cleansed and gown and linens changed. Denies nausea after being cleansed. Denies CP, \"Haven't had any since this morning\".

## 2021-03-23 NOTE — PROGRESS NOTES
Patient came down to nuclear cardiology w/ nausea and vomiting. Cardiology PA notified and nuc stress test on hold for now. Will reevaluate tomorrow AM to see if symptoms have improved.

## 2021-03-23 NOTE — ACP (ADVANCE CARE PLANNING)
Advance Care Planning     General Advance Care Planning (ACP) Conversation      Date of Conversation: 3/22/2021  Conducted with: Patient with Decision Making Capacity    Healthcare Decision Maker:     Patient going off the floor to [facility].   Unable to complete ACP documents        ERIKA Hall RN  Care Management  Pager: [phone]

## 2021-03-24 ENCOUNTER — APPOINTMENT (OUTPATIENT)
Dept: NON INVASIVE DIAGNOSTICS | Age: 61
End: 2021-03-24
Attending: PHYSICIAN ASSISTANT
Payer: COMMERCIAL

## 2021-03-24 VITALS
BODY MASS INDEX: 27.32 KG/M2 | RESPIRATION RATE: 20 BRPM | HEIGHT: 66 IN | WEIGHT: 170 LBS | DIASTOLIC BLOOD PRESSURE: 75 MMHG | HEART RATE: 76 BPM | TEMPERATURE: 99.7 F | OXYGEN SATURATION: 97 % | SYSTOLIC BLOOD PRESSURE: 120 MMHG

## 2021-03-24 LAB
ANION GAP SERPL CALC-SCNC: 4 MMOL/L (ref 3–18)
BASOPHILS # BLD: 0 K/UL (ref 0–0.1)
BASOPHILS NFR BLD: 0 % (ref 0–2)
BUN SERPL-MCNC: 22 MG/DL (ref 7–18)
BUN/CREAT SERPL: 29 (ref 12–20)
CALCIUM SERPL-MCNC: 8.8 MG/DL (ref 8.5–10.1)
CHLORIDE SERPL-SCNC: 109 MMOL/L (ref 100–111)
CHOLEST SERPL-MCNC: 204 MG/DL
CO2 SERPL-SCNC: 28 MMOL/L (ref 21–32)
CREAT SERPL-MCNC: 0.77 MG/DL (ref 0.6–1.3)
DIFFERENTIAL METHOD BLD: ABNORMAL
EOSINOPHIL # BLD: 0 K/UL (ref 0–0.4)
EOSINOPHIL NFR BLD: 0 % (ref 0–5)
ERYTHROCYTE [DISTWIDTH] IN BLOOD BY AUTOMATED COUNT: 14.3 % (ref 11.6–14.5)
GLUCOSE SERPL-MCNC: 91 MG/DL (ref 74–99)
HBA1C MFR BLD: 5.1 % (ref 4.2–5.6)
HCT VFR BLD AUTO: 35.9 % (ref 35–45)
HDLC SERPL-MCNC: 65 MG/DL (ref 40–60)
HDLC SERPL: 3.1 {RATIO} (ref 0–5)
HGB BLD-MCNC: 11.8 G/DL (ref 12–16)
LDLC SERPL CALC-MCNC: 125.4 MG/DL (ref 0–100)
LIPID PROFILE,FLP: ABNORMAL
LYMPHOCYTES # BLD: 1.8 K/UL (ref 0.9–3.6)
LYMPHOCYTES NFR BLD: 22 % (ref 21–52)
MCH RBC QN AUTO: 30.7 PG (ref 24–34)
MCHC RBC AUTO-ENTMCNC: 32.9 G/DL (ref 31–37)
MCV RBC AUTO: 93.5 FL (ref 74–97)
MONOCYTES # BLD: 0.9 K/UL (ref 0.05–1.2)
MONOCYTES NFR BLD: 11 % (ref 3–10)
NEUTS SEG # BLD: 5.4 K/UL (ref 1.8–8)
NEUTS SEG NFR BLD: 67 % (ref 40–73)
PLATELET # BLD AUTO: 200 K/UL (ref 135–420)
PMV BLD AUTO: 10.6 FL (ref 9.2–11.8)
POTASSIUM SERPL-SCNC: 3.5 MMOL/L (ref 3.5–5.5)
RBC # BLD AUTO: 3.84 M/UL (ref 4.2–5.3)
SODIUM SERPL-SCNC: 141 MMOL/L (ref 136–145)
TRIGL SERPL-MCNC: 68 MG/DL (ref ?–150)
VLDLC SERPL CALC-MCNC: 13.6 MG/DL
WBC # BLD AUTO: 8.1 K/UL (ref 4.6–13.2)

## 2021-03-24 PROCEDURE — C9113 INJ PANTOPRAZOLE SODIUM, VIA: HCPCS | Performed by: PHYSICIAN ASSISTANT

## 2021-03-24 PROCEDURE — 96376 TX/PRO/DX INJ SAME DRUG ADON: CPT

## 2021-03-24 PROCEDURE — 99217 PR OBSERVATION CARE DISCHARGE MANAGEMENT: CPT | Performed by: INTERNAL MEDICINE

## 2021-03-24 PROCEDURE — 36415 COLL VENOUS BLD VENIPUNCTURE: CPT

## 2021-03-24 PROCEDURE — APPSS60 APP SPLIT SHARED TIME 46-60 MINUTES: Performed by: NURSE PRACTITIONER

## 2021-03-24 PROCEDURE — 83036 HEMOGLOBIN GLYCOSYLATED A1C: CPT

## 2021-03-24 PROCEDURE — 80061 LIPID PANEL: CPT

## 2021-03-24 PROCEDURE — 74011250637 HC RX REV CODE- 250/637: Performed by: PHYSICIAN ASSISTANT

## 2021-03-24 PROCEDURE — 80048 BASIC METABOLIC PNL TOTAL CA: CPT

## 2021-03-24 PROCEDURE — 85025 COMPLETE CBC W/AUTO DIFF WBC: CPT

## 2021-03-24 PROCEDURE — 99218 HC RM OBSERVATION: CPT

## 2021-03-24 PROCEDURE — 99232 SBSQ HOSP IP/OBS MODERATE 35: CPT | Performed by: INTERNAL MEDICINE

## 2021-03-24 PROCEDURE — 93306 TTE W/DOPPLER COMPLETE: CPT

## 2021-03-24 PROCEDURE — 74011250636 HC RX REV CODE- 250/636: Performed by: PHYSICIAN ASSISTANT

## 2021-03-24 RX ORDER — ATORVASTATIN CALCIUM 40 MG/1
40 TABLET, FILM COATED ORAL
Qty: 30 TAB | Refills: 0 | Status: SHIPPED | OUTPATIENT
Start: 2021-03-24

## 2021-03-24 RX ORDER — METOPROLOL SUCCINATE 25 MG/1
25 TABLET, EXTENDED RELEASE ORAL DAILY
Qty: 30 TAB | Refills: 0 | Status: SHIPPED | OUTPATIENT
Start: 2021-03-24 | End: 2022-04-25

## 2021-03-24 RX ADMIN — LORATADINE 10 MG: 10 TABLET ORAL at 09:09

## 2021-03-24 RX ADMIN — PANTOPRAZOLE SODIUM 40 MG: 40 INJECTION, POWDER, FOR SOLUTION INTRAVENOUS at 09:09

## 2021-03-24 RX ADMIN — Medication 10 ML: at 05:41

## 2021-03-24 RX ADMIN — TELMISARTAN 80 MG: 40 TABLET ORAL at 09:09

## 2021-03-24 RX ADMIN — MONTELUKAST 10 MG: 10 TABLET, FILM COATED ORAL at 09:09

## 2021-03-24 RX ADMIN — FLUTICASONE PROPIONATE 2 SPRAY: 50 SPRAY, METERED NASAL at 09:00

## 2021-03-24 RX ADMIN — METOPROLOL SUCCINATE 25 MG: 25 TABLET, EXTENDED RELEASE ORAL at 09:09

## 2021-03-24 RX ADMIN — BUTALBITAL, ACETAMINOPHEN, AND CAFFEINE CAPSULES 1 CAPSULE: 50; 300; 40 CAPSULE ORAL at 04:21

## 2021-03-24 RX ADMIN — Medication 10 ML: at 15:14

## 2021-03-24 NOTE — PROGRESS NOTES
Observation notice provided in writing to patient and/or caregiver as well as verbal explanation of the policy. Patients who are in outpatient status also receive the Observation notice        D/C order noted for today. Orders reviewed. No needs identified at this time. Per pt, her daughter will be picking her up.         CLAUDIA LorenzN RN  Care Management  Pager: [phone]

## 2021-03-24 NOTE — PROGRESS NOTES
Problem: Falls - Risk of  Goal: *Absence of Falls  Description: Document Licha Lr Fall Risk and appropriate interventions in the flowsheet.   Outcome: Progressing Towards Goal  Note: Fall Risk Interventions:            Medication Interventions: Evaluate medications/consider consulting pharmacy, Patient to call before getting OOB, Teach patient to arise slowly                   Problem: Patient Education: Go to Patient Education Activity  Goal: Patient/Family Education  Outcome: Progressing Towards Goal     Problem: Pain  Goal: *Control of Pain  Outcome: Progressing Towards Goal  Goal: *PALLIATIVE CARE:  Alleviation of Pain  Outcome: Progressing Towards Goal     Problem: Patient Education: Go to Patient Education Activity  Goal: Patient/Family Education  Outcome: Progressing Towards Goal     Problem: General Medical Care Plan  Goal: *Vital signs within specified parameters  Outcome: Progressing Towards Goal  Goal: *Labs within defined limits  Outcome: Progressing Towards Goal  Goal: *Absence of infection signs and symptoms  Outcome: Progressing Towards Goal  Goal: *Optimal pain control at patient's stated goal  Outcome: Progressing Towards Goal  Goal: *Skin integrity maintained  Outcome: Progressing Towards Goal  Goal: *Fluid volume balance  Outcome: Progressing Towards Goal  Goal: *Optimize nutritional status  Outcome: Progressing Towards Goal  Goal: *Anxiety reduced or absent  Outcome: Progressing Towards Goal  Goal: *Progressive mobility and function (eg: ADL's)  Outcome: Progressing Towards Goal     Problem: Patient Education: Go to Patient Education Activity  Goal: Patient/Family Education  Outcome: Progressing Towards Goal

## 2021-03-24 NOTE — PROGRESS NOTES
Otolaryngology head and neck surgery  Patient seen and examined  Patient well-known to me having undergone endoscopic sinus surgery and turbinoplasty 2 days prior   Patient noted to have some atypical type chest pain. She states that she feels dramatically improved today. Patient has been having some difficulties with intermittent postnasal drainage comprised of dark blood causing her some nausea and expectoration. Physical exam reveals dried blood in nares  Oral cavity reveals no evidence of any posterior nasal bleed   neck supple nontender no masses palpable  Abdomen soft nontender    Impression from an otolaryngologic standpoint, patient's healing is normal.  Patient will have intermittent production of dark blood which is a normal setting after sinus surgery. Patient also with dissolvable nasal packing bilaterally which will liquefy and become admixed with old blood and mucus causing this postnasal drainage for 7 to 10 days post surgery  Patient at present n.p.o. Uncertain why but suspect perhaps patient with cardiac testing later today  From otolaryngologic standpoint, see no contraindication to discharge once cardiac clearance is noted.   I suspect patient may have been having increased anxiety post surgery causing her chest pain  Patient does have follow-up with me next week which she will keep  Further cardiac testing to be performed , would recommend avoidance of any stress or exertional type activity since this may cause some postoperative bleeding  At the present time however patient's production of blood is totally within normal limits there is no need for concern regarding this     Lesia Sales

## 2021-03-24 NOTE — DISCHARGE INSTRUCTIONS
Patient Education        Chest Pain: Care Instructions  Your Care Instructions     There are many things that can cause chest pain. Some are not serious and will get better on their own in a few days. But some kinds of chest pain need more testing and treatment. Your doctor may have recommended a follow-up visit in the next 8 to 12 hours. If you are not getting better, you may need more tests or treatment. Even though your doctor has released you, you still need to watch for any problems. The doctor carefully checked you, but sometimes problems can develop later. If you have new symptoms or if your symptoms do not get better, get medical care right away. If you have worse or different chest pain or pressure that lasts more than 5 minutes or you passed out (lost consciousness), call 911 or seek other emergency help right away. A medical visit is only one step in your treatment. Even if you feel better, you still need to do what your doctor recommends, such as going to all suggested follow-up appointments and taking medicines exactly as directed. This will help you recover and help prevent future problems. How can you care for yourself at home? · Rest until you feel better. · Take your medicine exactly as prescribed. Call your doctor if you think you are having a problem with your medicine. · Do not drive after taking a prescription pain medicine. When should you call for help? Call 911 if:     · You passed out (lost consciousness).     · You have severe difficulty breathing.     · You have symptoms of a heart attack. These may include:  ? Chest pain or pressure, or a strange feeling in your chest.  ? Sweating. ? Shortness of breath. ? Nausea or vomiting. ? Pain, pressure, or a strange feeling in your back, neck, jaw, or upper belly or in one or both shoulders or arms. ? Lightheadedness or sudden weakness. ? A fast or irregular heartbeat.   After you call 911, the  may tell you to chew 1 adult-strength or 2 to 4 low-dose aspirin. Wait for an ambulance. Do not try to drive yourself. Call your doctor today if:     · You have any trouble breathing.     · Your chest pain gets worse.     · You are dizzy or lightheaded, or you feel like you may faint.     · You are not getting better as expected.     · You are having new or different chest pain. Where can you learn more? Go to http://www.[employer].com/  Enter A120 in the search box to learn more about \"Chest Pain: Care Instructions. \"  Current as of: June 26, 2019               Content Version: 12.6  © 5863-2881 [employer]. Care instructions adapted under license by [employer] (which disclaims liability or warranty for this information). If you have questions about a medical condition or this instruction, always ask your healthcare professional. [address] any warranty or liability for your use of this information. DISCHARGE SUMMARY from Nurse    PATIENT INSTRUCTIONS:    After general anesthesia or intravenous sedation, for 24 hours or while taking prescription Narcotics:  · Limit your activities  · Do not drive and operate hazardous machinery  · Do not make important personal or business decisions  · Do  not drink alcoholic beverages  · If you have not urinated within 8 hours after discharge, please contact your surgeon on call.     Report the following to your surgeon:  · Excessive pain, swelling, redness or odor of or around the surgical area  · Temperature over 100.5  · Nausea and vomiting lasting longer than 4 hours or if unable to take medications  · Any signs of decreased circulation or nerve impairment to extremity: change in color, persistent  numbness, tingling, coldness or increase pain  · Any questions    What to do at Home:  Recommended activity: Activity as tolerated    If you experience any of the following symptoms fever greater than 100.5F, rapid heart rate, numbness/tingling in your fingers/toes, lightheadedness, or pain in your chest that does not resolve with stress, please follow up with your cardiologist or the nearest [facility]. *  Please give a list of your current medications to your Primary Care Provider. *  Please update this list whenever your medications are discontinued, doses are      changed, or new medications (including over-the-counter products) are added. *  Please carry medication information at all times in case of emergency situations. These are general instructions for a healthy lifestyle:    No smoking/ No tobacco products/ Avoid exposure to second hand smoke  Surgeon General's Warning:  Quitting smoking now greatly reduces serious risk to your health. Obesity, smoking, and sedentary lifestyle greatly increases your risk for illness    A healthy diet, regular physical exercise & weight monitoring are important for maintaining a healthy lifestyle    You may be retaining fluid if you have a history of heart failure or if you experience any of the following symptoms:  Weight gain of 3 pounds or more overnight or 5 pounds in a week, increased swelling in our hands or feet or shortness of breath while lying flat in bed. Please call your doctor as soon as you notice any of these symptoms; do not wait until your next office visit. The discharge information has been reviewed with the patient. The patient verbalized understanding. Discharge medications reviewed with the patient and appropriate educational materials and side effects teaching were provided.   ___________________________________________________________________________________________________________________________________

## 2021-03-24 NOTE — PROGRESS NOTES
[facility]  -  Progress Note      Patient: Sin Rodgers MRN: [id number]  SSN: [id number]    YOB: 1960  Age: 64 y.o. Sex: female      Admit Date: 3/22/2021    Assessment:     -Chest pain, negative cardiac enzymes x 3, normal EKG. -HTN, on Telmisartan  -Hyperlipidemia, lipid panel from 09/2017 with Chol 204, HDL 72, .8, TG 56.  Repeat lipid panel this admission (3/24/2021) revealed Chol 204, HDL 65, .4, TG 68  -Chronic sinusitis, s/p endoscopic sinus surgery and septoplasty by ENT 3/22/2021     No primary cardiologist    Plan:     -Pending Echocardiogram this morning.  -Started on low-dose Toprol this AM, Lipitor last night.  -Pt can follow-up with Dr. Monico Nageotte in 2-3 weeks and arrange stress test as outpatient, she is in agreement with plan. Pending discharge home today per primary team.    Subjective:     No new complaints.       Objective:      Patient Vitals for the past 8 hrs:   Temp Pulse Resp BP SpO2   03/24/21 0728 99.3 °F (37.4 °C) 74 20 (!) 147/81 97 %   03/24/21 0411 98.7 °F (37.1 °C) 68 18 123/67 95 %         Patient Vitals for the past 96 hrs:   Weight   03/23/21 1104 77.1 kg (170 lb)   03/22/21 1257 77.1 kg (170 lb)         Intake/Output Summary (Last 24 hours) at 3/24/2021 9959  Last data filed at 3/23/2021 2201  Gross per 24 hour   Intake    Output 800 ml   Net -800 ml       Physical Exam:  General:  alert, cooperative, no distress, appears stated age  Neck:  supple  Lungs:  clear to auscultation bilaterally  Heart:  Regular rate and rhythm  Abdomen:  abdomen is soft without significant tenderness, masses, organomegaly or guarding  Extremities:  Atraumatic, no edema     Data Review:     Labs: Results:       Chemistry Recent Labs     03/24/21  0600 03/22/21  1227   GLU 91 165*    141   K 3.5 3.4*    104   CO2 28 30   BUN 22* 9   CREA 0.77 0.78   CA 8.8 9.2   AGAP 4 7   BUCR 29* 12   AP  --  90   TP  --  7.4   ALB  --  3.9   GLOB  -- 3. 5   AGRAT  --  1.1      CBC w/Diff Recent Labs     03/24/21  0600 03/22/21  1227   WBC 8.1 8.8   RBC 3.84* 4.18*   HGB 11.8* 13.0   HCT 35.9 39.7    192   GRANS 67 90*   LYMPH 22 9*   EOS 0 0      Coagulation Recent Labs     03/22/21  1227   PTP 12.9   INR 1.0   APTT 30.2       Lipid Panel Lab Results   Component Value Date/Time    Cholesterol, total 204 (H) 03/24/2021 06:00 AM    HDL Cholesterol 65 (H) 03/24/2021 06:00 AM    LDL, calculated 125.4 (H) 03/24/2021 06:00 AM    VLDL, calculated 13.6 03/24/2021 06:00 AM    Triglyceride 68 03/24/2021 06:00 AM    CHOL/HDL Ratio 3.1 03/24/2021 06:00 AM      Liver Enzymes Recent Labs     03/22/21  1227   TP 7.4   ALB 3.9   AP 90

## 2021-03-24 NOTE — DISCHARGE SUMMARY
[facility]  Discharge Summary       Patient: Harsh Aburto Age: 64 y.o. : 1960 MR#: [id number]: [id number]  PCP on record: Enzo Smyth MD  Admit date: 3/22/2021  Discharge date: 3/24/2021    Disposition:    [x]Home   []Home with Home Health   []SNF/NH   []Rehab   []Home with family   []Alternate Facility:____________________    Discharge Diagnoses:                             Chest pain ruled out. Epistaxis   Post nasal drip from surgical intervention, blood from surgery site    Past medical history   HTN  HLD  Chronic sinusitis s/p endoscopic sinus surgery and septoplasty by ENT on 3/22/21    Discharge Medications:     Current Discharge Medication List      START taking these medications    Details   atorvastatin (LIPITOR) 40 mg tablet Take 1 Tab by mouth nightly. Qty: 30 Tab, Refills: 0      metoprolol succinate (TOPROL-XL) 25 mg XL tablet Take 1 Tab by mouth daily. Qty: 30 Tab, Refills: 0         CONTINUE these medications which have NOT CHANGED    Details   telmisartan (MICARDIS) 80 mg tablet Take 1 Tab by mouth daily. Qty: 90 Tab, Refills: 3      ibuprofen (MOTRIN) 800 mg tablet Take 1 Tab by mouth every eight (8) hours as needed for Pain. Qty: 90 Tab, Refills: 1    Associated Diagnoses: Acute right-sided low back pain with right-sided sciatica      cyclobenzaprine (FLEXERIL) 5 mg tablet Take 1 Tab by mouth nightly. Indications: muscle spasm  Qty: 30 Tab, Refills: 1    Associated Diagnoses: Acute right-sided low back pain with right-sided sciatica      butalbital-acetaminophen-caffeine (FIORICET, ESGIC) -40 mg per tablet Take 1 Tab by mouth every four (4) hours as needed for Headache. Qty: 60 Tab, Refills: 0    Associated Diagnoses: Migraine without aura and without status migrainosus, not intractable      fluticasone (FLONASE) 50 mcg/actuation nasal spray 2 Sprays by Both Nostrils route daily.   Qty: 1 Bottle, Refills: 2    Associated Diagnoses: Seasonal allergic rhinitis due to pollen      montelukast (SINGULAIR) 10 mg tablet Take 1 Tab by mouth daily. Qty: 30 Tab, Refills: 5    Associated Diagnoses: Seasonal allergic rhinitis due to pollen      potassium chloride (K-DUR, KLOR-CON) 20 mEq tablet Take 1 Tab by mouth daily. Qty: 30 Tab, Refills: 1      cetirizine (ZYRTEC) 10 mg tablet Take 1 Tab by mouth daily. Qty: 30 Tab, Refills: 0    Associated Diagnoses: Nasal congestion      ondansetron (ZOFRAN ODT) 4 mg disintegrating tablet Take 1 Tab by mouth every eight (8) hours as needed for Nausea. Qty: 30 Tab, Refills: 0    Associated Diagnoses: Nausea      clindamycin-benzoyl peroxide (BENZACLIN) 1-5 % topical gel Apply  to affected area two (2) times a day. Apply to affected area after the skin has been cleansed and dried. Qty: 1 Tube, Refills: 1    Associated Diagnoses: Acne vulgaris      multivitamin with iron-mineral 0.8 mg Cmpk Take  by mouth.              Consults:    Cardiology   ENT   GI   Procedures:  Chronic sinusitis s/p endoscopic sinus surgery and septoplasty by ENT on 3/22/21    Significant Diagnostic Studies:   Echo  EKG RESULTS     Procedure Component Value Units Date/Time    EKG, 12 LEAD, INITIAL [[id number]]     Order Status: Completed     EKG, 12 LEAD, INITIAL [[id number]] Collected: 03/22/21 1215    Order Status: Completed Updated: 03/22/21 1446     Ventricular Rate 65 BPM      Atrial Rate 65 BPM      P-R Interval 174 ms      QRS Duration 76 ms      Q-T Interval 392 ms      QTC Calculation (Bezet) 407 ms      Calculated P Axis 78 degrees      Calculated R Axis 64 degrees      Calculated T Axis 59 degrees      Diagnosis --     Normal sinus rhythm  Normal ECG  When compared with ECG of 17-MAR-2021 12:31,  No significant change was found  Confirmed by Susanne Viveros MD, Doni Colon ([id number]) on 3/22/2021 2:46:01 PM      EKG, 12 LEAD, INITIAL [[id number]] Collected: 03/17/21 1231    Order Status: Completed Updated: 03/17/21 1558     Ventricular Rate 63 BPM      Atrial Rate 63 BPM      P-R Interval 168 ms      QRS Duration 78 ms      Q-T Interval 424 ms      QTC Calculation (Bezet) 433 ms      Calculated P Axis 35 degrees      Calculated R Axis 13 degrees      Calculated T Axis 33 degrees      Diagnosis --     Normal sinus rhythm  Normal ECG  No previous ECGs available  Confirmed by Martha Alvarez MD, Rashawn Gregorio ([id number]) on 3/17/2021 3:58:13 PM            Hospital Course by Problem   Per H&P 63 yo AA woman with hx of HTN and sinusitis  who came to  [facility] ED with chest pain. The patient had planned outpatient procedure with Dr Kasey Concepcion yesterday at [facility]-  endoscopic sinus surgery and septoplasty per ED note- for chronic sinusitis disease. She was taken to recovery room and complained of non radiating chest pain and SOB with associated  nausea. She was then taken to [facility] ED for further eval.   In the ED- the patient was having hematemesis and nosebleed. Per ed note-   \"Discussed case with Dr. Vivek Julien he said that amount of oozing in the first 24 to 48 hours is normal after the lidocaine with epinephrine wears off.  He recommends giving some Afrin to both nares and will help with oozing.  Patient has foam packing and can be given Afrin on top of that. \"   Patient states she has been having hematemesis since Friday. When I went back to talk to patient, she said she got her days mixed up (because she has been working so much)  and now she denies hematemesis since Friday, it started since after surgery yesterday. She denies any nsaids nor AC nor hx of GIB. The patient was going to get a nuclear stress test this am but it was canceled due to hematemesis. I spoke to the nuclear stress tech who said she saw streaks of blood, not large volume.      Cardiology recommendations echo, BB and statin. Patient states she takes fish oil at home for cardiovascular health. Uncertain about taking statin at home.  Agrees with BB.     ENT Impression from an otolaryngologic standpoint, patient's healing is normal.  Patient will have intermittent production of dark blood which is a normal setting after sinus surgery. Patient also with dissolvable nasal packing bilaterally which will liquefy and become admixed with old blood and mucus causing this postnasal drainage for 7 to 10 days post surgery  Patient at present n.p.o. Uncertain why but suspect perhaps patient with cardiac testing later today  From otolaryngologic standpoint, see no contraindication to discharge once cardiac clearance is noted. I suspect patient may have been having increased anxiety post surgery causing her chest pain  Patient does have follow-up with me next week which she will keep  Further cardiac testing to be performed , would recommend avoidance of any stress or exertional type activity since this may cause some postoperative bleeding  At the present time however patient's production of blood is totally within normal limits there is no need for concern regarding this     GI no indication for endoscopic evaluation of upper GI tract, no NSAIDS/ASA. Emesis of ingested blood related to post op sinus surgery. HH stable. Today's examination of the patient revealed:     Subjective:    All 10 systems reviewed and negative except for post nasal drip   Objective:   VS:   Visit Vitals  BP (!) 151/70   Pulse 72   Temp 99.6 °F (37.6 °C)   Resp 20   Ht 5' 6\" (1.676 m)   Wt 77.1 kg (170 lb)   LMP 2016   SpO2 98%   Breastfeeding No   BMI 27.44 kg/m²      Tmax/24hrs: Temp (24hrs), Av.5 °F (37.5 °C), Min:98.7 °F (37.1 °C), Max:100.1 °F (37.8 °C)     Input/Output:     Intake/Output Summary (Last 24 hours) at 3/24/2021 1505  Last data filed at 3/23/2021 2201  Gross per 24 hour   Intake    Output 300 ml   Net -300 ml       General:  Alert, NAD  Cardiovascular:  RRR  Pulmonary:  LSC throughout  GI:  +BS in all four quadrants, soft, non-tender  Extremities:  No edema; 2+ dorsalis pedis pulses bilaterally  Neurology: Patient A&O x 4  Surgical drain to left side of neck     Labs:    Recent Results (from the past 24 hour(s))   HEMOGLOBIN A1C W/O EAG    Collection Time: 03/24/21  2:47 AM   Result Value Ref Range    Hemoglobin A1c 5.1 4.2 - 5.6 %   CBC WITH AUTOMATED DIFF    Collection Time: 03/24/21  6:00 AM   Result Value Ref Range    WBC 8.1 4.6 - 13.2 K/uL    RBC 3.84 (L) 4.20 - 5.30 M/uL    HGB 11.8 (L) 12.0 - 16.0 g/dL    HCT 35.9 35.0 - 45.0 %    MCV 93.5 74.0 - 97.0 FL    MCH 30.7 24.0 - 34.0 PG    MCHC 32.9 31.0 - 37.0 g/dL    RDW 14.3 11.6 - 14.5 %    PLATELET 337 507 - 425 K/uL    MPV 10.6 9.2 - 11.8 FL    NEUTROPHILS 67 40 - 73 %    LYMPHOCYTES 22 21 - 52 %    MONOCYTES 11 (H) 3 - 10 %    EOSINOPHILS 0 0 - 5 %    BASOPHILS 0 0 - 2 %    ABS. NEUTROPHILS 5.4 1.8 - 8.0 K/UL    ABS. LYMPHOCYTES 1.8 0.9 - 3.6 K/UL    ABS. MONOCYTES 0.9 0.05 - 1.2 K/UL    ABS. EOSINOPHILS 0.0 0.0 - 0.4 K/UL    ABS. BASOPHILS 0.0 0.0 - 0.1 K/UL    DF AUTOMATED     METABOLIC PANEL, BASIC    Collection Time: 03/24/21  6:00 AM   Result Value Ref Range    Sodium 141 136 - 145 mmol/L    Potassium 3.5 3.5 - 5.5 mmol/L    Chloride 109 100 - 111 mmol/L    CO2 28 21 - 32 mmol/L    Anion gap 4 3.0 - 18 mmol/L    Glucose 91 74 - 99 mg/dL    BUN 22 (H) 7.0 - 18 MG/DL    Creatinine 0.77 0.6 - 1.3 MG/DL    BUN/Creatinine ratio 29 (H) 12 - 20      GFR est AA >60 >60 ml/min/1.73m2    GFR est non-AA >60 >60 ml/min/1.73m2    Calcium 8.8 8.5 - 10.1 MG/DL   LIPID PANEL    Collection Time: 03/24/21  6:00 AM   Result Value Ref Range    LIPID PROFILE          Cholesterol, total 204 (H) <200 MG/DL    Triglyceride 68 <150 MG/DL    HDL Cholesterol 65 (H) 40 - 60 MG/DL    LDL, calculated 125.4 (H) 0 - 100 MG/DL    VLDL, calculated 13.6 MG/DL    CHOL/HDL Ratio 3.1 0 - 5.0       Additional Data Reviewed:     Condition: stable   Follow-up Appointments:   1. Your PCP: Trey Pascal MD, within 7-10 days   2.  Keep follow up appointment with ENT        >30 minutes spent coordinating this discharge (review instructions/follow-up, prescriptions, preparing report for sign off)    Signed:  Hank Pablo NP  3/24/2021  3:05 PM

## 2021-03-25 ENCOUNTER — TELEPHONE (OUTPATIENT)
Dept: CARDIOLOGY CLINIC | Age: 61
End: 2021-03-25

## 2021-03-25 NOTE — TELEPHONE ENCOUNTER
LMOM for patient to contact office to get Kang Young appointment scheduled with Dr. Collette Francis. Suzanne Huber Dial  Female, 64 y.o., 1960  Weight:   170 lb (77.1 kg)  MRN:   [id number]  Phone:   [phone] Llewellyn Headings)  PCP:   Vera Mason MD  Coverage:   [employer] [facility]  Last Appt With Me  Next Appt With Me  None  Next Appt  3/30/21 - Vera Mason MD - HR INTERNIST OF [address] [place] [address] follow-up  Received: [facility] Contents   PARVEEN [place] Jeanice Painter             Good afternoon, Can we please arrange outpatient follow-up with Dr. Collette Francis in 2-3 weeks? Sabrina Abebe you!

## 2021-03-30 ENCOUNTER — VIRTUAL VISIT (OUTPATIENT)
Dept: INTERNAL MEDICINE CLINIC | Age: 61
End: 2021-03-30
Payer: COMMERCIAL

## 2021-03-30 ENCOUNTER — TELEPHONE (OUTPATIENT)
Dept: INTERNAL MEDICINE CLINIC | Age: 61
End: 2021-03-30

## 2021-03-30 DIAGNOSIS — Z12.31 ENCOUNTER FOR SCREENING MAMMOGRAM FOR MALIGNANT NEOPLASM OF BREAST: ICD-10-CM

## 2021-03-30 DIAGNOSIS — I10 ESSENTIAL HYPERTENSION: ICD-10-CM

## 2021-03-30 DIAGNOSIS — E78.5 DYSLIPIDEMIA: ICD-10-CM

## 2021-03-30 DIAGNOSIS — E55.9 VITAMIN D DEFICIENCY: ICD-10-CM

## 2021-03-30 DIAGNOSIS — R04.0 EPISTAXIS: Primary | ICD-10-CM

## 2021-03-30 PROCEDURE — 99442 PR PHYS/QHP TELEPHONE EVALUATION 11-20 MIN: CPT | Performed by: INTERNAL MEDICINE

## 2021-03-30 NOTE — PROGRESS NOTES
Jermain Connolly is a 64 y.o. female, evaluated via audio-only technology on 3/30/2021 for Cholesterol Problem, Hypertension, and Epistaxis  . Assessment & Plan:   Diagnoses and all orders for this visit:    1. Epistaxis    2. Essential hypertension  -     METABOLIC PANEL, COMPREHENSIVE; Future    3. Dyslipidemia  -     LIPID PANEL; Future    4. Vitamin D deficiency  -     VITAMIN D, 25 HYDROXY; Future    5. Encounter for screening mammogram for malignant neoplasm of breast  -     LEOPOLDO MAMMO BI SCREENING INCL CAD; Future        12  Subjective:   Patient after having endoscopic sinus surgery was having some chest pain and elevated blood pressure in recovery and was referred to the emergency room for further evaluation. Patient was having epistaxis in the emergency room and was unable to get nuclear stress test because of the epistaxis. Patient was empirically started on Toprol-XL 25 mg and Lipitor 40 mg with plan to follow-up with cardiology and have an outpatient nuclear stress test.  She had an echocardiogram in the hospital which was within normal limits. Patient feels that her chest pain is due to anesthesia reaction which is possible with no previous history of cardiac issues. She will continue to monitor her blood pressure at home on the Micardis 80 mg daily which she is already taking and only if blood pressure is elevated she would add the Toprol-XL 25 mg. Also feel it is reasonable to hold the Lipitor until patient has her stress test.  If normal could probably hold on the Lipitor and consider doing a heart scan in the future to see what her coronary calcium score is. If it is low or zero may not need statin. Patient wants to try and recover from a recent ENT surgery and then follow-up with cardiology. She is aware that if she has any chest pain she will need to go back to the emergency room for evaluation. Prior to Admission medications    Medication Sig Start Date End Date Taking? Authorizing Provider   atorvastatin (LIPITOR) 40 mg tablet Take 1 Tab by mouth nightly. 3/24/21   Caron May NP   metoprolol succinate (TOPROL-XL) 25 mg XL tablet Take 1 Tab by mouth daily. 3/24/21   Caron May NP   telmisartan (MICARDIS) 80 mg tablet Take 1 Tab by mouth daily. 6/16/20   Annamaria Villatoro MD   ibuprofen (MOTRIN) 800 mg tablet Take 1 Tab by mouth every eight (8) hours as needed for Pain. 7/10/19   Rashel Persaud MD   cyclobenzaprine (FLEXERIL) 5 mg tablet Take 1 Tab by mouth nightly. Indications: muscle spasm 7/10/19   Annamaria Villatoro MD   butalbital-acetaminophen-caffeine (FIORICET, ESGIC) -40 mg per tablet Take 1 Tab by mouth every four (4) hours as needed for Headache. 7/10/19   Annamaria Villatoro MD   fluticasone (FLONASE) 50 mcg/actuation nasal spray 2 Sprays by Both Nostrils route daily. 1/9/19   Rashel Persaud MD   montelukast (SINGULAIR) 10 mg tablet Take 1 Tab by mouth daily. 1/9/19   Rashel Persaud MD   potassium chloride (K-DUR, KLOR-CON) 20 mEq tablet Take 1 Tab by mouth daily. 1/3/19   Annamaria Villatoro MD   cetirizine (ZYRTEC) 10 mg tablet Take 1 Tab by mouth daily. 1/25/18   Gabriella Koo DO   ondansetron (ZOFRAN ODT) 4 mg disintegrating tablet Take 1 Tab by mouth every eight (8) hours as needed for Nausea. 1/25/18   Arvind Koo DO   clindamycin-benzoyl peroxide (BENZACLIN) 1-5 % topical gel Apply  to affected area two (2) times a day. Apply to affected area after the skin has been cleansed and dried. 10/3/17   Annamaria Villatoro MD   multivitamin with iron-mineral 0.8 mg Cmpk Take  by mouth.     Provider, Historical     Patient Active Problem List   Diagnosis Code    Anxiety F41.9    AR (allergic rhinitis) J30.9    Acute bronchitis J20.9    Cough R05    Nasal congestion R09.81    Nausea R11.0    Acute chest pain R07.9       ROS    Patient-Reported Vitals 3/30/2021   Patient-Reported Pulse 62   Patient-Reported Systolic  558   Patient-Reported Diastolic 68        Chicken Linda, who was evaluated through a patient-initiated, synchronous (real-time) audio only encounter, and/or her healthcare decision maker, is aware that it is a billable service, with coverage as determined by her insurance carrier. She provided verbal consent to proceed: Yes. She has not had a related appointment within my department in the past 7 days or scheduled within the next 24 hours. Total Time: minutes: 11-20 minutes     Follow-up and Dispositions    · Return in about 4 weeks (around 4/27/2021) for labs 1 week before.            Jennifer Galinod MD

## 2021-04-05 NOTE — TELEPHONE ENCOUNTER
----- Message from Jaelyn Mayberry MD sent at 4/4/2021 11:45 AM EDT -----  Pt needs OV in 1 month and labs 1 week before

## 2021-04-06 DIAGNOSIS — Z12.31 ENCOUNTER FOR SCREENING MAMMOGRAM FOR MALIGNANT NEOPLASM OF BREAST: ICD-10-CM

## 2021-07-30 ENCOUNTER — TELEPHONE (OUTPATIENT)
Dept: INTERNAL MEDICINE CLINIC | Age: 61
End: 2021-07-30

## 2021-08-02 NOTE — TELEPHONE ENCOUNTER
Already in the system. Would give her central scheduling number since they have tried to call her multiple times.

## 2021-08-05 ENCOUNTER — APPOINTMENT (OUTPATIENT)
Dept: INTERNAL MEDICINE CLINIC | Age: 61
End: 2021-08-05

## 2021-08-05 ENCOUNTER — HOSPITAL ENCOUNTER (OUTPATIENT)
Dept: LAB | Age: 61
Discharge: HOME | End: 2021-08-05
Payer: COMMERCIAL

## 2021-08-05 DIAGNOSIS — E55.9 VITAMIN D DEFICIENCY: ICD-10-CM

## 2021-08-05 DIAGNOSIS — I10 ESSENTIAL HYPERTENSION: ICD-10-CM

## 2021-08-05 DIAGNOSIS — E78.5 DYSLIPIDEMIA: ICD-10-CM

## 2021-08-05 LAB
25(OH)D3 SERPL-MCNC: 28.3 NG/ML (ref 30–100)
ALBUMIN SERPL-MCNC: 4 G/DL (ref 3.4–5)
ALBUMIN/GLOB SERPL: 1.3 {RATIO} (ref 0.8–1.7)
ALP SERPL-CCNC: 89 U/L (ref 45–117)
ALT SERPL-CCNC: 31 U/L (ref 13–56)
ANION GAP SERPL CALC-SCNC: 3 MMOL/L (ref 3–18)
AST SERPL-CCNC: 13 U/L (ref 10–38)
BILIRUB SERPL-MCNC: 0.4 MG/DL (ref 0.2–1)
BUN SERPL-MCNC: 13 MG/DL (ref 7–18)
BUN/CREAT SERPL: 19 (ref 12–20)
CALCIUM SERPL-MCNC: 9 MG/DL (ref 8.5–10.1)
CHLORIDE SERPL-SCNC: 109 MMOL/L (ref 100–111)
CHOLEST SERPL-MCNC: 213 MG/DL
CO2 SERPL-SCNC: 29 MMOL/L (ref 21–32)
CREAT SERPL-MCNC: 0.7 MG/DL (ref 0.6–1.3)
GLOBULIN SER CALC-MCNC: 3.1 G/DL (ref 2–4)
GLUCOSE SERPL-MCNC: 82 MG/DL (ref 74–99)
HDLC SERPL-MCNC: 56 MG/DL (ref 40–60)
HDLC SERPL: 3.8 {RATIO} (ref 0–5)
LDLC SERPL CALC-MCNC: 142.6 MG/DL (ref 0–100)
LIPID PROFILE,FLP: ABNORMAL
POTASSIUM SERPL-SCNC: 3.6 MMOL/L (ref 3.5–5.5)
PROT SERPL-MCNC: 7.1 G/DL (ref 6.4–8.2)
SODIUM SERPL-SCNC: 141 MMOL/L (ref 136–145)
TRIGL SERPL-MCNC: 72 MG/DL (ref ?–150)
VLDLC SERPL CALC-MCNC: 14.4 MG/DL

## 2021-08-05 PROCEDURE — 36415 COLL VENOUS BLD VENIPUNCTURE: CPT

## 2021-08-05 PROCEDURE — 80061 LIPID PANEL: CPT

## 2021-08-05 PROCEDURE — 80053 COMPREHEN METABOLIC PANEL: CPT

## 2021-08-05 PROCEDURE — 82306 VITAMIN D 25 HYDROXY: CPT

## 2021-09-15 ENCOUNTER — HOSPITAL ENCOUNTER (OUTPATIENT)
Dept: MAMMOGRAPHY | Age: 61
Discharge: HOME | End: 2021-09-15
Attending: INTERNAL MEDICINE
Payer: COMMERCIAL

## 2021-09-15 PROCEDURE — 77067 SCR MAMMO BI INCL CAD: CPT

## 2022-03-18 PROBLEM — R07.9 ACUTE CHEST PAIN: Status: ACTIVE | Noted: 2021-03-22

## 2022-03-19 PROBLEM — R09.81 NASAL CONGESTION: Status: ACTIVE | Noted: 2018-01-25

## 2022-03-19 PROBLEM — R11.0 NAUSEA: Status: ACTIVE | Noted: 2018-01-25

## 2022-03-19 PROBLEM — J20.9 ACUTE BRONCHITIS: Status: ACTIVE | Noted: 2018-01-25

## 2022-03-20 PROBLEM — R05.9 COUGH: Status: ACTIVE | Noted: 2018-01-25

## 2022-03-31 ENCOUNTER — TELEPHONE (OUTPATIENT)
Dept: INTERNAL MEDICINE CLINIC | Age: 62
End: 2022-03-31

## 2022-03-31 DIAGNOSIS — E55.9 VITAMIN D DEFICIENCY: ICD-10-CM

## 2022-03-31 DIAGNOSIS — Z00.00 PHYSICAL EXAM: Primary | ICD-10-CM

## 2022-03-31 RX ORDER — TELMISARTAN 80 MG/1
80 TABLET ORAL DAILY
Qty: 90 TABLET | Refills: 0 | Status: SHIPPED | OUTPATIENT
Start: 2022-03-31 | End: 2022-05-09

## 2022-03-31 RX ORDER — TELMISARTAN 80 MG/1
80 TABLET ORAL DAILY
Qty: 14 TABLET | Refills: 0 | Status: SHIPPED | OUTPATIENT
Start: 2022-03-31 | End: 2022-05-09 | Stop reason: SDUPTHER

## 2022-03-31 NOTE — TELEPHONE ENCOUNTER
Pt needs a 10 days upply of Telmisartan to go [facility] at [facility]. She is out of med and just ordered from her new mail order under other encounter.

## 2022-03-31 NOTE — TELEPHONE ENCOUNTER
Last Visit: 3/30/21 with MD Persaud  Next Appointment: none  Previous Refill Encounter(s): 7/12/21 #90 with 1 refill    Requested Prescriptions     Pending Prescriptions Disp Refills    telmisartan (MICARDIS) 80 mg tablet 90 Tablet 1     Sig: Take 1 Tablet by mouth daily.  telmisartan (MICARDIS) 80 mg tablet 14 Tablet 0     Sig: Take 1 Tablet by mouth daily.

## 2022-03-31 NOTE — TELEPHONE ENCOUNTER
appts scheduled/  Physical 04/21/22 labs 04/14/22    Pt asking please send in enough medication to last until her appt.  She is out of medication

## 2022-04-15 ENCOUNTER — HOSPITAL ENCOUNTER (OUTPATIENT)
Dept: LAB | Age: 62
Discharge: HOME | End: 2022-04-15
Payer: COMMERCIAL

## 2022-04-15 ENCOUNTER — APPOINTMENT (OUTPATIENT)
Dept: INTERNAL MEDICINE CLINIC | Age: 62
End: 2022-04-15

## 2022-04-15 DIAGNOSIS — E55.9 VITAMIN D DEFICIENCY: ICD-10-CM

## 2022-04-15 DIAGNOSIS — Z00.00 PHYSICAL EXAM: ICD-10-CM

## 2022-04-15 LAB
25(OH)D3 SERPL-MCNC: 32.2 NG/ML (ref 30–100)
ALBUMIN SERPL-MCNC: 3.9 G/DL (ref 3.4–5)
ALBUMIN/GLOB SERPL: 1.4 {RATIO} (ref 0.8–1.7)
ALP SERPL-CCNC: 78 U/L (ref 45–117)
ALT SERPL-CCNC: 46 U/L (ref 13–56)
ANION GAP SERPL CALC-SCNC: 2 MMOL/L (ref 3–18)
AST SERPL-CCNC: 19 U/L (ref 10–38)
BASOPHILS # BLD: 0 K/UL (ref 0–0.1)
BASOPHILS NFR BLD: 1 % (ref 0–2)
BILIRUB SERPL-MCNC: 0.4 MG/DL (ref 0.2–1)
BUN SERPL-MCNC: 17 MG/DL (ref 7–18)
BUN/CREAT SERPL: 22 (ref 12–20)
CALCIUM SERPL-MCNC: 9.1 MG/DL (ref 8.5–10.1)
CHLORIDE SERPL-SCNC: 110 MMOL/L (ref 100–111)
CHOLEST SERPL-MCNC: 215 MG/DL
CO2 SERPL-SCNC: 30 MMOL/L (ref 21–32)
CREAT SERPL-MCNC: 0.76 MG/DL (ref 0.6–1.3)
DIFFERENTIAL METHOD BLD: ABNORMAL
EOSINOPHIL # BLD: 0.1 K/UL (ref 0–0.4)
EOSINOPHIL NFR BLD: 2 % (ref 0–5)
ERYTHROCYTE [DISTWIDTH] IN BLOOD BY AUTOMATED COUNT: 14.2 % (ref 11.6–14.5)
GLOBULIN SER CALC-MCNC: 2.8 G/DL (ref 2–4)
GLUCOSE SERPL-MCNC: 85 MG/DL (ref 74–99)
HCT VFR BLD AUTO: 38.7 % (ref 35–45)
HDLC SERPL-MCNC: 56 MG/DL (ref 40–60)
HDLC SERPL: 3.8 {RATIO} (ref 0–5)
HGB BLD-MCNC: 12.1 G/DL (ref 12–16)
IMM GRANULOCYTES # BLD AUTO: 0 K/UL (ref 0–0.04)
IMM GRANULOCYTES NFR BLD AUTO: 0 % (ref 0–0.5)
LDLC SERPL CALC-MCNC: 145.2 MG/DL (ref 0–100)
LIPID PROFILE,FLP: ABNORMAL
LYMPHOCYTES # BLD: 1.7 K/UL (ref 0.9–3.6)
LYMPHOCYTES NFR BLD: 47 % (ref 21–52)
MCH RBC QN AUTO: 30.1 PG (ref 24–34)
MCHC RBC AUTO-ENTMCNC: 31.3 G/DL (ref 31–37)
MCV RBC AUTO: 96.3 FL (ref 78–100)
MONOCYTES # BLD: 0.3 K/UL (ref 0.05–1.2)
MONOCYTES NFR BLD: 8 % (ref 3–10)
NEUTS SEG # BLD: 1.6 K/UL (ref 1.8–8)
NEUTS SEG NFR BLD: 44 % (ref 40–73)
NRBC # BLD: 0 K/UL (ref 0–0.01)
NRBC BLD-RTO: 0 PER 100 WBC
PLATELET # BLD AUTO: 208 K/UL (ref 135–420)
PMV BLD AUTO: 11 FL (ref 9.2–11.8)
POTASSIUM SERPL-SCNC: 3.8 MMOL/L (ref 3.5–5.5)
PROT SERPL-MCNC: 6.7 G/DL (ref 6.4–8.2)
RBC # BLD AUTO: 4.02 M/UL (ref 4.2–5.3)
SODIUM SERPL-SCNC: 142 MMOL/L (ref 136–145)
TRIGL SERPL-MCNC: 69 MG/DL (ref ?–150)
VLDLC SERPL CALC-MCNC: 13.8 MG/DL
WBC # BLD AUTO: 3.7 K/UL (ref 4.6–13.2)

## 2022-04-15 PROCEDURE — 36415 COLL VENOUS BLD VENIPUNCTURE: CPT

## 2022-04-15 PROCEDURE — 80053 COMPREHEN METABOLIC PANEL: CPT

## 2022-04-15 PROCEDURE — 82306 VITAMIN D 25 HYDROXY: CPT

## 2022-04-15 PROCEDURE — 85025 COMPLETE CBC W/AUTO DIFF WBC: CPT

## 2022-04-15 PROCEDURE — 80061 LIPID PANEL: CPT

## 2022-04-21 ENCOUNTER — OFFICE VISIT (OUTPATIENT)
Dept: INTERNAL MEDICINE CLINIC | Age: 62
End: 2022-04-21
Payer: COMMERCIAL

## 2022-04-21 VITALS
RESPIRATION RATE: 20 BRPM | HEIGHT: 66 IN | OXYGEN SATURATION: 100 % | WEIGHT: 174 LBS | TEMPERATURE: 97.6 F | BODY MASS INDEX: 27.97 KG/M2 | SYSTOLIC BLOOD PRESSURE: 169 MMHG | DIASTOLIC BLOOD PRESSURE: 87 MMHG | HEART RATE: 69 BPM

## 2022-04-21 DIAGNOSIS — Z00.00 PHYSICAL EXAM: Primary | ICD-10-CM

## 2022-04-21 DIAGNOSIS — I10 ESSENTIAL HYPERTENSION: ICD-10-CM

## 2022-04-21 DIAGNOSIS — Z12.11 COLON CANCER SCREENING: ICD-10-CM

## 2022-04-21 DIAGNOSIS — G43.009 MIGRAINE WITHOUT AURA AND WITHOUT STATUS MIGRAINOSUS, NOT INTRACTABLE: ICD-10-CM

## 2022-04-21 PROCEDURE — 99396 PREV VISIT EST AGE 40-64: CPT | Performed by: INTERNAL MEDICINE

## 2022-04-21 RX ORDER — BUTALBITAL, ACETAMINOPHEN AND CAFFEINE 50; 325; 40 MG/1; MG/1; MG/1
1 TABLET ORAL
Qty: 60 TABLET | Refills: 0 | Status: SHIPPED | OUTPATIENT
Start: 2022-04-21

## 2022-04-21 NOTE — PATIENT INSTRUCTIONS
A Healthy Lifestyle: Care Instructions  Your Care Instructions     A healthy lifestyle can help you feel good, stay at a healthy weight, and have plenty of energy for both work and play. A healthy lifestyle is something you can share with your whole family. A healthy lifestyle also can lower your risk for serious health problems, such as high blood pressure, heart disease, and diabetes. You can follow a few steps listed below to improve your health and the health of your family. Follow-up care is a key part of your treatment and safety. Be sure to make and go to all appointments, and call your doctor if you are having problems. It's also a good idea to know your test results and keep a list of the medicines you take. How can you care for yourself at home? · Do not eat too much sugar, fat, or fast foods. You can still have dessert and treats now and then. The goal is moderation. · Start small to improve your eating habits. Pay attention to portion sizes, drink less juice and soda pop, and eat more fruits and vegetables. ? Eat a healthy amount of food. A 3-ounce serving of meat, for example, is about the size of a deck of cards. Fill the rest of your plate with vegetables and whole grains. ? Limit the amount of soda and sports drinks you have every day. Drink more water when you are thirsty. ? Eat plenty of fruits and vegetables every day. Have an apple or some carrot sticks as an afternoon snack instead of a candy bar. Try to have fruits and/or vegetables at every meal.  · Make exercise part of your daily routine. You may want to start with simple activities, such as walking, bicycling, or slow swimming. Try to be active 30 to 60 minutes every day. You do not need to do all 30 to 60 minutes all at once. For example, you can exercise 3 times a day for 10 or 20 minutes.  Moderate exercise is safe for most people, but it is always a good idea to talk to your doctor before starting an exercise program.  · Keep moving. [employer] the lawn, work in the garden, or [employer]. Take the stairs instead of the elevator at work. · If you smoke, quit. People who smoke have an increased risk for heart attack, stroke, cancer, and other lung illnesses. Quitting is hard, but there are ways to boost your chance of quitting tobacco for good. ? Use nicotine gum, patches, or lozenges. ? Ask your doctor about stop-smoking programs and medicines. ? Keep trying. In addition to reducing your risk of diseases in the future, you will notice some benefits soon after you stop using tobacco. If you have shortness of breath or asthma symptoms, they will likely get better within a few weeks after you quit. · Limit how much alcohol you drink. Moderate amounts of alcohol (up to 2 drinks a day for men, 1 drink a day for women) are okay. But drinking too much can lead to liver problems, high blood pressure, and other health problems. Family health  If you have a family, there are many things you can do together to improve your health. · Eat meals together as a family as often as possible. · Eat healthy foods. This includes fruits, vegetables, lean meats and dairy, and whole grains. · Include your family in your fitness plan. Most people think of activities such as jogging or tennis as the way to fitness, but there are many ways you and your family can be more active. Anything that makes you breathe hard and gets your heart pumping is exercise. Here are some tips:  ? Walk to do errands or to take your child to school or the bus.  ? Go for a family bike ride after dinner instead of watching TV. Where can you learn more? Go to http://www.del rio.com/  Enter V723 in the search box to learn more about \"A Healthy Lifestyle: Care Instructions. \"  Current as of: June 16, 2021               Content Version: 13.2  © 5760-0710 [employer].    Care instructions adapted under license by [employer] (which disclaims liability or warranty for this information). If you have questions about a medical condition or this instruction, always ask your healthcare professional. [address] any warranty or liability for your use of this information.

## 2022-04-21 NOTE — PROGRESS NOTES
Patient is in the office today for a physical.    1. \"Have you been to the ER, urgent care clinic since your last visit? Hospitalized since your last visit? \" No    2. \"Have you seen or consulted any other health care providers outside of the [address] [place] [address] since your last visit? \" No     3. For patients aged 39-70: Has the patient had a colonoscopy / FIT/ Cologuard? No      If the patient is female:    4. For patients aged 41-77: Has the patient had a mammogram within the past 2 years? No      5. For patients aged 21-65: Has the patient had a pap smear?  No

## 2022-04-25 NOTE — PROGRESS NOTES
Subjective:   58 y.o. female for Well Woman Check. Her gyne and breast care is done elsewhere by her Ob-Gyne physician. Patient Active Problem List   Diagnosis Code    Anxiety F41.9    AR (allergic rhinitis) J30.9    Acute bronchitis J20.9    Cough R05.9    Nasal congestion R09.81    Nausea R11.0    Acute chest pain R07.9     Current Outpatient Medications   Medication Sig Dispense Refill    butalbital-acetaminophen-caffeine (FIORICET, ESGIC) -40 mg per tablet Take 1 Tablet by mouth every four (4) hours as needed for Headache. 60 Tablet 0    telmisartan (MICARDIS) 80 mg tablet Take 1 Tablet by mouth daily. 90 Tablet 0    telmisartan (MICARDIS) 80 mg tablet Take 1 Tablet by mouth daily. 14 Tablet 0    fluticasone (FLONASE) 50 mcg/actuation nasal spray 2 Sprays by Both Nostrils route daily. 1 Bottle 2    multivitamin with iron-mineral 0.8 mg Cmpk Take  by mouth.  atorvastatin (LIPITOR) 40 mg tablet Take 1 Tab by mouth nightly. (Patient not taking: Reported on 4/21/2022) 30 Tab 0    ibuprofen (MOTRIN) 800 mg tablet Take 1 Tab by mouth every eight (8) hours as needed for Pain. (Patient not taking: Reported on 4/21/2022) 90 Tab 1    cyclobenzaprine (FLEXERIL) 5 mg tablet Take 1 Tab by mouth nightly. Indications: muscle spasm (Patient not taking: Reported on 4/21/2022) 30 Tab 1    montelukast (SINGULAIR) 10 mg tablet Take 1 Tab by mouth daily. (Patient not taking: Reported on 4/21/2022) 30 Tab 5    potassium chloride (K-DUR, KLOR-CON) 20 mEq tablet Take 1 Tab by mouth daily. (Patient not taking: Reported on 4/21/2022) 30 Tab 1    cetirizine (ZYRTEC) 10 mg tablet Take 1 Tab by mouth daily. (Patient not taking: Reported on 4/21/2022) 30 Tab 0    ondansetron (ZOFRAN ODT) 4 mg disintegrating tablet Take 1 Tab by mouth every eight (8) hours as needed for Nausea.  (Patient not taking: Reported on 4/21/2022) 30 Tab 0    clindamycin-benzoyl peroxide (BENZACLIN) 1-5 % topical gel Apply  to affected area two (2) times a day. Apply to affected area after the skin has been cleansed and dried. (Patient not taking: Reported on 4/21/2022) 1 Tube 1        Lab Results   Component Value Date/Time    WBC 3.7 (L) 04/15/2022 11:33 AM    HGB 12.1 04/15/2022 11:33 AM    HCT 38.7 04/15/2022 11:33 AM    PLATELET 011 59/95/6427 11:33 AM    MCV 96.3 04/15/2022 11:33 AM     Lab Results   Component Value Date/Time    Cholesterol, total 215 (H) 04/15/2022 11:33 AM    HDL Cholesterol 56 04/15/2022 11:33 AM    LDL, calculated 145.2 (H) 04/15/2022 11:33 AM    Triglyceride 69 04/15/2022 11:33 AM    CHOL/HDL Ratio 3.8 04/15/2022 11:33 AM     Lab Results   Component Value Date/Time    Sodium 142 04/15/2022 11:33 AM    Potassium 3.8 04/15/2022 11:33 AM    Chloride 110 04/15/2022 11:33 AM    CO2 30 04/15/2022 11:33 AM    Anion gap 2 (L) 04/15/2022 11:33 AM    Glucose 85 04/15/2022 11:33 AM    BUN 17 04/15/2022 11:33 AM    Creatinine 0.76 04/15/2022 11:33 AM    BUN/Creatinine ratio 22 (H) 04/15/2022 11:33 AM    GFR est AA >60 04/15/2022 11:33 AM    GFR est non-AA >60 04/15/2022 11:33 AM    Calcium 9.1 04/15/2022 11:33 AM    Bilirubin, total 0.4 04/15/2022 11:33 AM    ALT (SGPT) 46 04/15/2022 11:33 AM    Alk. phosphatase 78 04/15/2022 11:33 AM    Protein, total 6.7 04/15/2022 11:33 AM    Albumin 3.9 04/15/2022 11:33 AM    Globulin 2.8 04/15/2022 11:33 AM    A-G Ratio 1.4 04/15/2022 11:33 AM         ROS: Feeling generally well. No TIA's or unusual headaches, no dysphagia. No prolonged cough. No dyspnea or chest pain on exertion. No abdominal pain, change in bowel habits, black or bloody stools. No urinary tract symptoms. No new or unusual musculoskeletal symptoms. Specific concerns today: Patient's high blood pressure is uncontrolled on Micardis 80 mg daily. Patient under a lot of stress with her job which she is trying to decrease and she continues to work on trying to increase exercise and improve diet.   She continues to have chronic headaches for which she uses Fioricet as needed. We will try and get blood pressure under control to make sure that this is not effective for headaches. .    Objective: The patient appears well, alert, oriented x 3, in no distress. Visit Vitals  BP (!) 169/87 (BP 1 Location: Left arm, BP Patient Position: Sitting, BP Cuff Size: Adult)   Pulse 69   Temp 97.6 °F (36.4 °C) (Temporal)   Resp 20   Ht 5' 6\" (1.676 m)   Wt 174 lb (78.9 kg)   LMP 05/03/2016   SpO2 100%   BMI 28.08 kg/m²     ENT normal.  Neck supple. No adenopathy or thyromegaly. SAMMIE. Lungs are clear, good air entry, no wheezes, rhonchi or rales. S1 and S2 normal, no murmurs, regular rate and rhythm. Abdomen soft without tenderness, guarding, mass or organomegaly. Extremities show no edema, normal peripheral pulses. Neurological is normal, no focal findings. Breast and Pelvic exams are deferred. Assessment/Plan:   Well Woman  lose weight, increase physical activity, call if any problems    ICD-10-CM ICD-9-CM    1. Physical exam  Z00.00 V70.9    2. Essential hypertension  I10 401.9  uncontrolled on telmisartan 80 mg daily. Patient to monitor blood pressure at home and if not improved by next office visit would adjust medications   3. Migraine without aura and without status migrainosus, not intractable  G43.009 346.10  controlled on butalbital-acetaminophen-caffeine (FIORICET, ESGIC) -40 mg per tablet   4. Colon cancer screening  Z12.11 V76.51 REFERRAL TO GASTROENTEROLOGY     Follow-up and Dispositions    · Return in about 4 weeks (around 5/19/2022) for BP check.

## 2022-05-09 RX ORDER — TELMISARTAN 80 MG/1
80 TABLET ORAL DAILY
Qty: 90 TABLET | Refills: 3 | Status: SHIPPED | OUTPATIENT
Start: 2022-05-09

## 2022-06-25 ENCOUNTER — HOSPITAL ENCOUNTER (OUTPATIENT)
Dept: CT IMAGING | Age: 62
Discharge: HOME | End: 2022-06-25
Payer: SELF-PAY

## 2022-06-25 DIAGNOSIS — Z13.6 SCREENING FOR ISCHEMIC HEART DISEASE: ICD-10-CM

## 2022-06-25 PROCEDURE — 75571 CT HRT W/O DYE W/CA TEST: CPT

## 2022-07-11 ENCOUNTER — TELEPHONE (OUTPATIENT)
Dept: OTHER | Age: 62
End: 2022-07-11

## 2022-08-09 ENCOUNTER — TELEPHONE (OUTPATIENT)
Dept: OTHER | Age: 62
End: 2022-08-09

## 2022-08-09 NOTE — TELEPHONE ENCOUNTER
Patient stated that she has been traveling and had missed the message from me. I have previously mailed the results to this patient. Patient identity verified and matched to demographic data. Patient notified of Coronary Calcium Score of  34. Follow up recommended with Primary Care MD Persaud. Patient verbalized understanding of results, patient education, and follow up care.

## 2023-04-28 ENCOUNTER — HOSPITAL ENCOUNTER (OUTPATIENT)
Facility: HOSPITAL | Age: 63
Setting detail: SPECIMEN
End: 2023-04-28
Payer: COMMERCIAL

## 2023-04-28 DIAGNOSIS — Z00.00 PHYSICAL EXAM: ICD-10-CM

## 2023-04-28 LAB
ALBUMIN SERPL-MCNC: 4.1 G/DL (ref 3.4–5)
ALBUMIN/GLOB SERPL: 1.5 (ref 0.8–1.7)
ALP SERPL-CCNC: 76 U/L (ref 45–117)
ALT SERPL-CCNC: 24 U/L (ref 13–56)
ANION GAP SERPL CALC-SCNC: 2 MMOL/L (ref 3–18)
AST SERPL-CCNC: 16 U/L (ref 10–38)
BASOPHILS # BLD: 0 K/UL (ref 0–0.1)
BASOPHILS NFR BLD: 1 % (ref 0–2)
BILIRUB SERPL-MCNC: 0.5 MG/DL (ref 0.2–1)
BUN SERPL-MCNC: 11 MG/DL (ref 7–18)
BUN/CREAT SERPL: 15 (ref 12–20)
CALCIUM SERPL-MCNC: 9.4 MG/DL (ref 8.5–10.1)
CHLORIDE SERPL-SCNC: 111 MMOL/L (ref 100–111)
CHOLEST SERPL-MCNC: 203 MG/DL
CO2 SERPL-SCNC: 29 MMOL/L (ref 21–32)
CREAT SERPL-MCNC: 0.72 MG/DL (ref 0.6–1.3)
DIFFERENTIAL METHOD BLD: ABNORMAL
EOSINOPHIL # BLD: 0 K/UL (ref 0–0.4)
EOSINOPHIL NFR BLD: 1 % (ref 0–5)
ERYTHROCYTE [DISTWIDTH] IN BLOOD BY AUTOMATED COUNT: 13.6 % (ref 11.6–14.5)
GLOBULIN SER CALC-MCNC: 2.7 G/DL (ref 2–4)
GLUCOSE SERPL-MCNC: 86 MG/DL (ref 74–99)
HCT VFR BLD AUTO: 39 % (ref 35–45)
HDLC SERPL-MCNC: 61 MG/DL (ref 40–60)
HDLC SERPL: 3.3 (ref 0–5)
HGB BLD-MCNC: 12.3 G/DL (ref 12–16)
IMM GRANULOCYTES # BLD AUTO: 0 K/UL (ref 0–0.04)
IMM GRANULOCYTES NFR BLD AUTO: 0 % (ref 0–0.5)
LDLC SERPL CALC-MCNC: 130.6 MG/DL (ref 0–100)
LIPID PANEL: ABNORMAL
LYMPHOCYTES # BLD: 1.8 K/UL (ref 0.9–3.6)
LYMPHOCYTES NFR BLD: 51 % (ref 21–52)
MCH RBC QN AUTO: 31.3 PG (ref 24–34)
MCHC RBC AUTO-ENTMCNC: 31.5 G/DL (ref 31–37)
MCV RBC AUTO: 99.2 FL (ref 78–100)
MONOCYTES # BLD: 0.2 K/UL (ref 0.05–1.2)
MONOCYTES NFR BLD: 5 % (ref 3–10)
NEUTS SEG # BLD: 1.5 K/UL (ref 1.8–8)
NEUTS SEG NFR BLD: 42 % (ref 40–73)
NRBC # BLD: 0 K/UL (ref 0–0.01)
NRBC BLD-RTO: 0 PER 100 WBC
PLATELET # BLD AUTO: 214 K/UL (ref 135–420)
PMV BLD AUTO: 11.6 FL (ref 9.2–11.8)
POTASSIUM SERPL-SCNC: 4 MMOL/L (ref 3.5–5.5)
PROT SERPL-MCNC: 6.8 G/DL (ref 6.4–8.2)
RBC # BLD AUTO: 3.93 M/UL (ref 4.2–5.3)
SODIUM SERPL-SCNC: 142 MMOL/L (ref 136–145)
TRIGL SERPL-MCNC: 57 MG/DL
VLDLC SERPL CALC-MCNC: 11.4 MG/DL
WBC # BLD AUTO: 3.5 K/UL (ref 4.6–13.2)

## 2023-04-28 PROCEDURE — 80053 COMPREHEN METABOLIC PANEL: CPT

## 2023-04-28 PROCEDURE — 36415 COLL VENOUS BLD VENIPUNCTURE: CPT

## 2023-04-28 PROCEDURE — 80061 LIPID PANEL: CPT

## 2023-04-28 PROCEDURE — 85025 COMPLETE CBC W/AUTO DIFF WBC: CPT

## 2023-05-04 ENCOUNTER — OFFICE VISIT (OUTPATIENT)
Age: 63
End: 2023-05-04
Payer: COMMERCIAL

## 2023-05-04 VITALS
OXYGEN SATURATION: 99 % | TEMPERATURE: 97.7 F | WEIGHT: 171 LBS | BODY MASS INDEX: 27.48 KG/M2 | RESPIRATION RATE: 20 BRPM | SYSTOLIC BLOOD PRESSURE: 152 MMHG | HEART RATE: 78 BPM | HEIGHT: 66 IN | DIASTOLIC BLOOD PRESSURE: 89 MMHG

## 2023-05-04 DIAGNOSIS — E78.00 HIGH CHOLESTEROL: ICD-10-CM

## 2023-05-04 DIAGNOSIS — Z12.31 BREAST CANCER SCREENING BY MAMMOGRAM: ICD-10-CM

## 2023-05-04 DIAGNOSIS — Z00.00 ENCOUNTER FOR WELL ADULT EXAM WITHOUT ABNORMAL FINDINGS: Primary | ICD-10-CM

## 2023-05-04 DIAGNOSIS — I10 ESSENTIAL (PRIMARY) HYPERTENSION: ICD-10-CM

## 2023-05-04 DIAGNOSIS — Z12.11 COLON CANCER SCREENING: ICD-10-CM

## 2023-05-04 PROCEDURE — 3078F DIAST BP <80 MM HG: CPT | Performed by: INTERNAL MEDICINE

## 2023-05-04 PROCEDURE — 99396 PREV VISIT EST AGE 40-64: CPT | Performed by: INTERNAL MEDICINE

## 2023-05-04 PROCEDURE — 3074F SYST BP LT 130 MM HG: CPT | Performed by: INTERNAL MEDICINE

## 2023-05-04 SDOH — ECONOMIC STABILITY: FOOD INSECURITY: WITHIN THE PAST 12 MONTHS, THE FOOD YOU BOUGHT JUST DIDN'T LAST AND YOU DIDN'T HAVE MONEY TO GET MORE.: NEVER TRUE

## 2023-05-04 SDOH — ECONOMIC STABILITY: FOOD INSECURITY: WITHIN THE PAST 12 MONTHS, YOU WORRIED THAT YOUR FOOD WOULD RUN OUT BEFORE YOU GOT MONEY TO BUY MORE.: NEVER TRUE

## 2023-05-04 SDOH — ECONOMIC STABILITY: INCOME INSECURITY: HOW HARD IS IT FOR YOU TO PAY FOR THE VERY BASICS LIKE FOOD, HOUSING, MEDICAL CARE, AND HEATING?: NOT HARD AT ALL

## 2023-05-04 SDOH — ECONOMIC STABILITY: HOUSING INSECURITY
IN THE LAST 12 MONTHS, WAS THERE A TIME WHEN YOU DID NOT HAVE A STEADY PLACE TO SLEEP OR SLEPT IN A SHELTER (INCLUDING NOW)?: NO

## 2023-05-04 ASSESSMENT — PATIENT HEALTH QUESTIONNAIRE - PHQ9
SUM OF ALL RESPONSES TO PHQ QUESTIONS 1-9: 0
SUM OF ALL RESPONSES TO PHQ QUESTIONS 1-9: 0
2. FEELING DOWN, DEPRESSED OR HOPELESS: 0
SUM OF ALL RESPONSES TO PHQ QUESTIONS 1-9: 0
SUM OF ALL RESPONSES TO PHQ QUESTIONS 1-9: 0
SUM OF ALL RESPONSES TO PHQ9 QUESTIONS 1 & 2: 0
1. LITTLE INTEREST OR PLEASURE IN DOING THINGS: 0

## 2023-05-04 NOTE — PROGRESS NOTES
Kathy Course presents today for   Chief Complaint   Patient presents with    Annual Exam   1. \"Have you been to the ER, urgent care clinic since your last visit? Hospitalized since your last visit? \" no    2. \"Have you seen or consulted any other health care providers outside of the [address] [place] [address] since your last visit? \" no     3. For patients aged 39-70: Has the patient had a colonoscopy / FIT/ Cologuard? Yes - no Care Gap present      If the patient is female:    4. For patients aged 41-77: Has the patient had a mammogram within the past 2 years? Yes - no Care Gap present      5. For patients aged 21-65: Has the patient had a pap smear?  Yes - no Care Gap present

## 2023-05-04 NOTE — PROGRESS NOTES
Well Adult Note  Name: Daphne Penn Date: 2023   MRN: [id number] Sex: Female   Age: 61 y.o. Ethnicity: Non- / Non    : 1960 Race: Black / Dillon Jose is here for well adult exam.  History:  Patient's high blood pressure is uncontrolled on my Micardis 80 mg daily. Patient advised to monitor her blood pressure daily as she begins to exercise and eat better which she was not doing over the last few months as she was taking care of a sick family member. Patient had a heart scan that showed a coronary artery calcium score of 34. Patient was advised to consider statin but never started it. Patient's cholesterol is mildly elevated with LDL of 130. Patient reluctant to start a statin and will work on diet exercise and weight loss. Review of Systems    Allergies   Allergen Reactions    Latex Other (See Comments)     Burning Sensation    Banana Swelling    Doxycycline Calcium Nausea Only         Prior to Visit Medications    Medication Sig Taking?  Authorizing Provider   telmisartan (MICARDIS) 80 MG tablet TAKE 1 TABLET BY MOUTH  DAILY Yes Aston Darling MD   fluticasone (FLONASE) 50 MCG/ACT nasal spray 2 sprays by Nasal route daily Yes Ar Automatic Reconciliation   cetirizine (ZYRTEC) 10 MG tablet Take 10 mg by mouth daily  Ar Automatic Reconciliation         Past Medical History:   Diagnosis Date    Anxiety 10/20/2010    Headache     Hypercholesterolemia     Hypertension        Past Surgical History:   Procedure Laterality Date    GYN      etopic pregnancy         Family History   Problem Relation Age of Onset    Breast Cancer Sister     Breast Cancer Sister        Social History     Tobacco Use    Smoking status: Never     Passive exposure: Never    Smokeless tobacco: Never   Substance Use Topics    Alcohol use: No    Drug use: No       Objective   BP (!) 152/89 (Site: Right Upper Arm, Position: Sitting, Cuff Size: Large Adult)   Pulse 78   Temp

## 2023-09-28 RX ORDER — TELMISARTAN 80 MG/1
TABLET ORAL
Qty: 90 TABLET | Refills: 3 | Status: SHIPPED | OUTPATIENT
Start: 2023-09-28

## 2023-11-09 ENCOUNTER — HOSPITAL ENCOUNTER (OUTPATIENT)
Facility: HOSPITAL | Age: 63
Setting detail: SPECIMEN
Discharge: HOME | End: 2023-11-09
Payer: COMMERCIAL

## 2023-11-09 DIAGNOSIS — I10 ESSENTIAL (PRIMARY) HYPERTENSION: ICD-10-CM

## 2023-11-09 DIAGNOSIS — E78.00 HIGH CHOLESTEROL: ICD-10-CM

## 2023-11-09 LAB
ALBUMIN SERPL-MCNC: 3.9 G/DL (ref 3.4–5)
ALBUMIN/GLOB SERPL: 1.4 (ref 0.8–1.7)
ALP SERPL-CCNC: 83 U/L (ref 45–117)
ALT SERPL-CCNC: 27 U/L (ref 13–56)
ANION GAP SERPL CALC-SCNC: 3 MMOL/L (ref 3–18)
AST SERPL-CCNC: 19 U/L (ref 10–38)
BILIRUB SERPL-MCNC: 0.4 MG/DL (ref 0.2–1)
BUN SERPL-MCNC: 13 MG/DL (ref 7–18)
BUN/CREAT SERPL: 16 (ref 12–20)
CALCIUM SERPL-MCNC: 9.6 MG/DL (ref 8.5–10.1)
CHLORIDE SERPL-SCNC: 109 MMOL/L (ref 100–111)
CHOLEST SERPL-MCNC: 238 MG/DL
CO2 SERPL-SCNC: 30 MMOL/L (ref 21–32)
CREAT SERPL-MCNC: 0.8 MG/DL (ref 0.6–1.3)
GLOBULIN SER CALC-MCNC: 2.7 G/DL (ref 2–4)
GLUCOSE SERPL-MCNC: 91 MG/DL (ref 74–99)
HDLC SERPL-MCNC: 63 MG/DL (ref 40–60)
HDLC SERPL: 3.8 (ref 0–5)
LDLC SERPL CALC-MCNC: 165 MG/DL (ref 0–100)
LIPID PANEL: ABNORMAL
POTASSIUM SERPL-SCNC: 4.1 MMOL/L (ref 3.5–5.5)
PROT SERPL-MCNC: 6.6 G/DL (ref 6.4–8.2)
SODIUM SERPL-SCNC: 142 MMOL/L (ref 136–145)
TRIGL SERPL-MCNC: 50 MG/DL
VLDLC SERPL CALC-MCNC: 10 MG/DL

## 2023-11-09 PROCEDURE — 80053 COMPREHEN METABOLIC PANEL: CPT

## 2023-11-09 PROCEDURE — 80061 LIPID PANEL: CPT

## 2023-11-09 PROCEDURE — 36415 COLL VENOUS BLD VENIPUNCTURE: CPT

## 2023-11-16 ENCOUNTER — HOSPITAL ENCOUNTER (OUTPATIENT)
Facility: HOSPITAL | Age: 63
Discharge: HOME | End: 2023-11-16
Attending: INTERNAL MEDICINE
Payer: COMMERCIAL

## 2023-11-16 ENCOUNTER — OFFICE VISIT (OUTPATIENT)
Age: 63
End: 2023-11-16
Payer: COMMERCIAL

## 2023-11-16 VITALS
DIASTOLIC BLOOD PRESSURE: 84 MMHG | OXYGEN SATURATION: 99 % | RESPIRATION RATE: 20 BRPM | TEMPERATURE: 97.9 F | SYSTOLIC BLOOD PRESSURE: 135 MMHG | BODY MASS INDEX: 26.84 KG/M2 | WEIGHT: 167 LBS | HEART RATE: 77 BPM | HEIGHT: 66 IN

## 2023-11-16 VITALS — HEIGHT: 66 IN | WEIGHT: 171.08 LBS | BODY MASS INDEX: 27.49 KG/M2

## 2023-11-16 DIAGNOSIS — G43.009 MIGRAINE WITHOUT AURA, NOT INTRACTABLE, WITHOUT STATUS MIGRAINOSUS: ICD-10-CM

## 2023-11-16 DIAGNOSIS — E78.00 HIGH CHOLESTEROL: ICD-10-CM

## 2023-11-16 DIAGNOSIS — Z12.31 BREAST CANCER SCREENING BY MAMMOGRAM: ICD-10-CM

## 2023-11-16 DIAGNOSIS — E55.9 VITAMIN D DEFICIENCY: ICD-10-CM

## 2023-11-16 DIAGNOSIS — I10 ESSENTIAL (PRIMARY) HYPERTENSION: Primary | ICD-10-CM

## 2023-11-16 DIAGNOSIS — Z00.00 PHYSICAL EXAM: Primary | ICD-10-CM

## 2023-11-16 PROCEDURE — 99214 OFFICE O/P EST MOD 30 MIN: CPT | Performed by: INTERNAL MEDICINE

## 2023-11-16 PROCEDURE — 3078F DIAST BP <80 MM HG: CPT | Performed by: INTERNAL MEDICINE

## 2023-11-16 PROCEDURE — 77063 BREAST TOMOSYNTHESIS BI: CPT

## 2023-11-16 PROCEDURE — 3075F SYST BP GE 130 - 139MM HG: CPT | Performed by: INTERNAL MEDICINE

## 2023-11-16 RX ORDER — SUMATRIPTAN 100 MG/1
TABLET, FILM COATED ORAL
Qty: 9 TABLET | Refills: 5 | Status: SHIPPED | OUTPATIENT
Start: 2023-11-16

## 2023-11-16 RX ORDER — BUTALBITAL, ACETAMINOPHEN AND CAFFEINE 50; 325; 40 MG/1; MG/1; MG/1
1 TABLET ORAL EVERY 4 HOURS PRN
Qty: 180 TABLET | Refills: 1 | COMMUNITY
Start: 2023-11-16

## 2023-11-16 NOTE — PROGRESS NOTES
Wei Olvera presents today for   Chief Complaint   Patient presents with    Hypertension    Cholesterol Problem     Follow up                 1. \"Have you been to the ER, urgent care clinic since your last visit? Hospitalized since your last visit? \" no    2. \"Have you seen or consulted any other health care providers outside of the [address] [place] [address] since your last visit? \" no     3. For patients aged 43-73: Has the patient had a colonoscopy / FIT/ Cologuard? Yes - no Care Gap present      If the patient is female:    4. For patients aged 43-66: Has the patient had a mammogram within the past 2 years? Yes - no Care Gap present      5. For patients aged 21-65: Has the patient had a pap smear?  Yes - no Care Gap present

## 2023-11-16 NOTE — PROGRESS NOTES
Cliff Elliott (:  1960) is a 61 y.o. female established patient, here for evaluation of the following chief complaint(s):  Hypertension, Cholesterol Problem (Follow up), and Migraine         ASSESSMENT/PLAN:    ICD-10-CM    1. Essential (primary) hypertension  I10 Patient with strong history of whitecoat hypertension which seems to be controlled on Micardis 80 mg at home. 2. High cholesterol  E78.00 Uncontrolled with a coronary artery calcium score of 34. If not improving with diet may consider statin in the future. 3. Migraine without aura, not intractable, without status migrainosus  G43.009 Fairly well-controlled with butalbital-acetaminophen-caffeine (FIORICET, ESGIC) -40 MG per tablet     We will see if she has any further improvement on SUMAtriptan (IMITREX) 100 MG tablet             Return in about 6 months (around 2024) for Physical, labs 1 week before. Subjective   SUBJECTIVE/OBJECTIVE:  Patient has significant whitecoat hypertension. Blood pressure is normally controlled at home with systolic blood pressure in the 120s on Micardis 80 mg. Patient's cholesterol is uncontrolled even with intermittent fasting and cutting back starches in her diet. She had a coronary artery calcium score 2022 that was 34 so she is unable to improve her cholesterol over the next 6 months may need to consider a statin. The 10-year ASCVD risk score (Nishant DK, et al., 2019) is: 10.4%  Patient has intermittent migraine headaches for which she uses Fioricet. A lot of her migraine headaches are related to allergic rhinitis which has been treated with immunotherapy but will see if she may benefit from triptan such as Imitrex. Patient continues to exercise on a regular basis. Patient has a history of vitamin D deficiency and should be on vitamin D 2000 units/day.     Respiratory ROS: negative for - shortness of breath  Cardiovascular ROS: negative for - chest pain       Objective   BP

## 2024-06-20 ENCOUNTER — TELEPHONE (OUTPATIENT)
Facility: CLINIC | Age: 64
End: 2024-06-20

## 2024-06-20 NOTE — TELEPHONE ENCOUNTER
----- Message from Madelyn Ocampo sent at 6/20/2024  9:44 AM EDT -----  Regarding: ECC Message to Provider  ECC Message to Provider    Relationship to Patient: Self     Additional Information: Patient is asking is she will be getting a blood work for her upcoming appointment with Oswald Christianson MD this July 23, 2024  --------------------------------------------------------------------------------------------------------------------------    Call Back Information: OK to leave message on voicemail  Preferred Call Back Number: Phone: [phone]

## 2024-07-23 ENCOUNTER — HOSPITAL ENCOUNTER (OUTPATIENT)
Facility: HOSPITAL | Age: 64
Setting detail: SPECIMEN
Discharge: HOME | End: 2024-07-26
Payer: COMMERCIAL

## 2024-07-23 DIAGNOSIS — E55.9 VITAMIN D DEFICIENCY: ICD-10-CM

## 2024-07-23 DIAGNOSIS — Z00.00 PHYSICAL EXAM: ICD-10-CM

## 2024-07-23 LAB
25(OH)D3 SERPL-MCNC: 24.2 NG/ML (ref 30–100)
ALBUMIN SERPL-MCNC: 4 G/DL (ref 3.4–5)
ALBUMIN/GLOB SERPL: 1.4 (ref 0.8–1.7)
ALP SERPL-CCNC: 78 U/L (ref 45–117)
ALT SERPL-CCNC: 18 U/L (ref 13–56)
ANION GAP SERPL CALC-SCNC: 6 MMOL/L (ref 3–18)
AST SERPL-CCNC: 16 U/L (ref 10–38)
BASOPHILS # BLD: 0 K/UL (ref 0–0.1)
BASOPHILS NFR BLD: 1 % (ref 0–2)
BILIRUB SERPL-MCNC: 0.6 MG/DL (ref 0.2–1)
BUN SERPL-MCNC: 18 MG/DL (ref 7–18)
BUN/CREAT SERPL: 24 (ref 12–20)
CALCIUM SERPL-MCNC: 9.6 MG/DL (ref 8.5–10.1)
CHLORIDE SERPL-SCNC: 109 MMOL/L (ref 100–111)
CHOLEST SERPL-MCNC: 203 MG/DL
CO2 SERPL-SCNC: 28 MMOL/L (ref 21–32)
CREAT SERPL-MCNC: 0.76 MG/DL (ref 0.6–1.3)
DIFFERENTIAL METHOD BLD: ABNORMAL
EOSINOPHIL # BLD: 0.1 K/UL (ref 0–0.4)
EOSINOPHIL NFR BLD: 2 % (ref 0–5)
ERYTHROCYTE [DISTWIDTH] IN BLOOD BY AUTOMATED COUNT: 13.1 % (ref 11.6–14.5)
GLOBULIN SER CALC-MCNC: 2.9 G/DL (ref 2–4)
GLUCOSE SERPL-MCNC: 97 MG/DL (ref 74–99)
HCT VFR BLD AUTO: 41.3 % (ref 35–45)
HDLC SERPL-MCNC: 59 MG/DL (ref 40–60)
HDLC SERPL: 3.4 (ref 0–5)
HGB BLD-MCNC: 13 G/DL (ref 12–16)
IMM GRANULOCYTES # BLD AUTO: 0 K/UL (ref 0–0.04)
IMM GRANULOCYTES NFR BLD AUTO: 0 % (ref 0–0.5)
LDLC SERPL CALC-MCNC: 135.2 MG/DL (ref 0–100)
LIPID PANEL: ABNORMAL
LYMPHOCYTES # BLD: 1.9 K/UL (ref 0.9–3.6)
LYMPHOCYTES NFR BLD: 53 % (ref 21–52)
MCH RBC QN AUTO: 30.7 PG (ref 24–34)
MCHC RBC AUTO-ENTMCNC: 31.5 G/DL (ref 31–37)
MCV RBC AUTO: 97.6 FL (ref 78–100)
MONOCYTES # BLD: 0.3 K/UL (ref 0.05–1.2)
MONOCYTES NFR BLD: 9 % (ref 3–10)
NEUTS SEG # BLD: 1.3 K/UL (ref 1.8–8)
NEUTS SEG NFR BLD: 35 % (ref 40–73)
NRBC # BLD: 0 K/UL (ref 0–0.01)
NRBC BLD-RTO: 0 PER 100 WBC
PLATELET # BLD AUTO: 224 K/UL (ref 135–420)
PMV BLD AUTO: 10.5 FL (ref 9.2–11.8)
POTASSIUM SERPL-SCNC: 4.1 MMOL/L (ref 3.5–5.5)
PROT SERPL-MCNC: 6.9 G/DL (ref 6.4–8.2)
RBC # BLD AUTO: 4.23 M/UL (ref 4.2–5.3)
SODIUM SERPL-SCNC: 143 MMOL/L (ref 136–145)
TRIGL SERPL-MCNC: 44 MG/DL
VLDLC SERPL CALC-MCNC: 8.8 MG/DL
WBC # BLD AUTO: 3.6 K/UL (ref 4.6–13.2)

## 2024-07-23 PROCEDURE — 85025 COMPLETE CBC W/AUTO DIFF WBC: CPT

## 2024-07-23 PROCEDURE — 80061 LIPID PANEL: CPT

## 2024-07-23 PROCEDURE — 80053 COMPREHEN METABOLIC PANEL: CPT

## 2024-07-23 PROCEDURE — 36415 COLL VENOUS BLD VENIPUNCTURE: CPT

## 2024-07-23 PROCEDURE — 82306 VITAMIN D 25 HYDROXY: CPT

## 2024-08-01 ENCOUNTER — OFFICE VISIT (OUTPATIENT)
Facility: CLINIC | Age: 64
End: 2024-08-01
Payer: COMMERCIAL

## 2024-08-01 VITALS
HEART RATE: 67 BPM | RESPIRATION RATE: 20 BRPM | HEIGHT: 66 IN | DIASTOLIC BLOOD PRESSURE: 90 MMHG | OXYGEN SATURATION: 99 % | WEIGHT: 163 LBS | SYSTOLIC BLOOD PRESSURE: 161 MMHG | BODY MASS INDEX: 26.2 KG/M2 | TEMPERATURE: 97.6 F

## 2024-08-01 DIAGNOSIS — Z12.11 COLON CANCER SCREENING: ICD-10-CM

## 2024-08-01 DIAGNOSIS — I10 ESSENTIAL (PRIMARY) HYPERTENSION: Primary | ICD-10-CM

## 2024-08-01 DIAGNOSIS — E55.9 VITAMIN D DEFICIENCY: ICD-10-CM

## 2024-08-01 DIAGNOSIS — E78.00 HIGH CHOLESTEROL: ICD-10-CM

## 2024-08-01 DIAGNOSIS — G43.009 MIGRAINE WITHOUT AURA, NOT INTRACTABLE, WITHOUT STATUS MIGRAINOSUS: ICD-10-CM

## 2024-08-01 PROCEDURE — 3079F DIAST BP 80-89 MM HG: CPT | Performed by: INTERNAL MEDICINE

## 2024-08-01 PROCEDURE — 3077F SYST BP >= 140 MM HG: CPT | Performed by: INTERNAL MEDICINE

## 2024-08-01 PROCEDURE — 99214 OFFICE O/P EST MOD 30 MIN: CPT | Performed by: INTERNAL MEDICINE

## 2024-08-01 RX ORDER — SUMATRIPTAN 100 MG/1
TABLET, FILM COATED ORAL
Qty: 9 TABLET | Refills: 5 | Status: SHIPPED | OUTPATIENT
Start: 2024-08-01

## 2024-08-01 RX ORDER — AMLODIPINE BESYLATE 5 MG/1
5 TABLET ORAL DAILY
Qty: 30 TABLET | Refills: 3 | Status: SHIPPED | OUTPATIENT
Start: 2024-08-01

## 2024-08-01 RX ORDER — BUTALBITAL, ACETAMINOPHEN AND CAFFEINE 50; 325; 40 MG/1; MG/1; MG/1
1 TABLET ORAL EVERY 4 HOURS PRN
Qty: 180 TABLET | Refills: 1 | Status: SHIPPED | OUTPATIENT
Start: 2024-08-01

## 2024-08-01 SDOH — ECONOMIC STABILITY: FOOD INSECURITY: WITHIN THE PAST 12 MONTHS, YOU WORRIED THAT YOUR FOOD WOULD RUN OUT BEFORE YOU GOT MONEY TO BUY MORE.: NEVER TRUE

## 2024-08-01 SDOH — ECONOMIC STABILITY: INCOME INSECURITY: HOW HARD IS IT FOR YOU TO PAY FOR THE VERY BASICS LIKE FOOD, HOUSING, MEDICAL CARE, AND HEATING?: NOT HARD AT ALL

## 2024-08-01 SDOH — ECONOMIC STABILITY: FOOD INSECURITY: WITHIN THE PAST 12 MONTHS, THE FOOD YOU BOUGHT JUST DIDN'T LAST AND YOU DIDN'T HAVE MONEY TO GET MORE.: NEVER TRUE

## 2024-08-01 ASSESSMENT — PATIENT HEALTH QUESTIONNAIRE - PHQ9
SUM OF ALL RESPONSES TO PHQ QUESTIONS 1-9: 0
2. FEELING DOWN, DEPRESSED OR HOPELESS: NOT AT ALL
SUM OF ALL RESPONSES TO PHQ QUESTIONS 1-9: 0
1. LITTLE INTEREST OR PLEASURE IN DOING THINGS: NOT AT ALL
SUM OF ALL RESPONSES TO PHQ9 QUESTIONS 1 & 2: 0

## 2024-08-01 NOTE — PROGRESS NOTES
Tien Persaud (:  1960) is a 64 y.o. female established patient, here for evaluation of the following chief complaint(s):  Hypertension, Cholesterol Problem (6 month follow up ), and Other (Fmla for migriane and HTN )      Assessment & Plan   ASSESSMENT/PLAN:    ICD-10-CM    1. Essential (primary) hypertension  I10 Uncontrolled on Micardis 80 mg.  If not better controlled at home with start amLODIPine (NORVASC) 5 MG tablet      2. High cholesterol  E78.00 Improved control with intermittent fasting but would consider repeating heart scan or starting a statin next 6 months if not better      3. Migraine without aura, not intractable, without status migrainosus  G43.009 Not optimally controlled on SUMAtriptan (IMITREX) 100 MG tablet     butalbital-acetaminophen-caffeine (FIORICET, ESGIC) -40 MG per tablet which may be due to patient's blood pressure not being well-controlled      4. Vitamin D deficiency  E55.9 Uncontrolled patient to start vitamin D 2000 units/day      5. Colon cancer screening  Z12.11 Cologuard (Fecal DNA Colorectal Cancer Screening)             Return in about 3 weeks (around 2024) for BP Check.         Subjective   SUBJECTIVE/OBJECTIVE:  Patient has significant whitecoat hypertension.  Blood pressure is normally controlled at home with systolic blood pressure in the 120s on Micardis 80 mg.  Pt to bring in BP readings at next OV but would start Norvasc 5 mg if SBP>150 at home. Patient's cholesterol is better controlled even with intermittent fasting and cutting back starches in her diet.  She had a coronary artery calcium score 2022 that was 34 so she if she is  unable to improve her cholesterol over the next 6 months may need to consider a statin or do another Heart Scan.  The 10-year ASCVD risk score (Nishant DK, et al., 2019) is: 14.8%  Patient has intermittent migraine headaches for which she uses Fioricet.  A lot of her migraine headaches are related to allergic rhinitis

## 2024-08-01 NOTE — PROGRESS NOTES
Tien Persaud presents today for   Chief Complaint   Patient presents with    Hypertension    Cholesterol Problem     6 month follow up            \"Have you been to the ER, urgent care clinic since your last visit?  Hospitalized since your last visit?\"    NO    “Have you seen or consulted any other health care providers outside of [facility] since your last visit?”    NO    “Have you had a colorectal cancer screening such as a colonoscopy/FIT/Cologuard?    NO    Date of last Colonoscopy: 2/28/2012  No cologuard on file  No FIT/FOBT on file   No flexible sigmoidoscopy on file         “Have you had a pap smear?”    NO    No cervical cancer screening on file

## 2024-08-10 RX ORDER — TELMISARTAN 80 MG/1
TABLET ORAL
Qty: 90 TABLET | Refills: 3 | Status: SHIPPED | OUTPATIENT
Start: 2024-08-10

## 2024-10-26 LAB — NONINV COLON CA DNA+OCC BLD SCRN STL QL: NEGATIVE

## 2024-10-28 ENCOUNTER — TELEPHONE (OUTPATIENT)
Facility: CLINIC | Age: 64
End: 2024-10-28

## 2024-10-28 NOTE — TELEPHONE ENCOUNTER
Left message for patient to call the office.     RE:  Tell him/her that their cologuard was normal

## 2024-12-24 ENCOUNTER — OFFICE VISIT (OUTPATIENT)
Facility: CLINIC | Age: 64
End: 2024-12-24
Payer: COMMERCIAL

## 2024-12-24 VITALS
RESPIRATION RATE: 18 BRPM | BODY MASS INDEX: 25.39 KG/M2 | WEIGHT: 158 LBS | SYSTOLIC BLOOD PRESSURE: 131 MMHG | TEMPERATURE: 97.4 F | HEIGHT: 66 IN | OXYGEN SATURATION: 100 % | HEART RATE: 65 BPM | DIASTOLIC BLOOD PRESSURE: 71 MMHG

## 2024-12-24 DIAGNOSIS — R05.1 ACUTE COUGH: ICD-10-CM

## 2024-12-24 DIAGNOSIS — E78.00 HIGH CHOLESTEROL: ICD-10-CM

## 2024-12-24 DIAGNOSIS — J01.90 ACUTE NON-RECURRENT SINUSITIS, UNSPECIFIED LOCATION: Primary | ICD-10-CM

## 2024-12-24 DIAGNOSIS — I10 ESSENTIAL (PRIMARY) HYPERTENSION: ICD-10-CM

## 2024-12-24 DIAGNOSIS — M79.605 LEFT LEG PAIN: ICD-10-CM

## 2024-12-24 DIAGNOSIS — E55.9 VITAMIN D DEFICIENCY: ICD-10-CM

## 2024-12-24 DIAGNOSIS — Z12.31 BREAST CANCER SCREENING BY MAMMOGRAM: ICD-10-CM

## 2024-12-24 PROCEDURE — 3078F DIAST BP <80 MM HG: CPT | Performed by: INTERNAL MEDICINE

## 2024-12-24 PROCEDURE — 3075F SYST BP GE 130 - 139MM HG: CPT | Performed by: INTERNAL MEDICINE

## 2024-12-24 PROCEDURE — 99214 OFFICE O/P EST MOD 30 MIN: CPT | Performed by: INTERNAL MEDICINE

## 2024-12-24 RX ORDER — CODEINE PHOSPHATE AND GUAIFENESIN 10; 100 MG/5ML; MG/5ML
5 SOLUTION ORAL 4 TIMES DAILY PRN
Qty: 120 ML | Refills: 0 | Status: SHIPPED | OUTPATIENT
Start: 2024-12-24 | End: 2024-12-31

## 2024-12-24 ASSESSMENT — PATIENT HEALTH QUESTIONNAIRE - PHQ9
1. LITTLE INTEREST OR PLEASURE IN DOING THINGS: NOT AT ALL
SUM OF ALL RESPONSES TO PHQ QUESTIONS 1-9: 0
SUM OF ALL RESPONSES TO PHQ9 QUESTIONS 1 & 2: 0
SUM OF ALL RESPONSES TO PHQ QUESTIONS 1-9: 0
2. FEELING DOWN, DEPRESSED OR HOPELESS: NOT AT ALL

## 2024-12-24 NOTE — PROGRESS NOTES
Tien Persaud (:  1960) is a 64 y.o. female established patient, here for evaluation of the following chief complaint(s):  Sinusitis and Leg Pain (left)      Assessment & Plan   ASSESSMENT/PLAN:    ICD-10-CM    1. Acute non-recurrent sinusitis, unspecified location  J01.90 Will treat with amoxicillin-clavulanate (AUGMENTIN) 875-125 MG per tablet      2. Acute cough  R05.1 Will treat with guaiFENesin-codeine (GUAIFENESIN AC) 100-10 MG/5ML liquid      3. Left leg pain  M79.605 Possible muscle strain versus iliotibial PL syndrome [facility] for further evaluation and management      4. Essential (primary) hypertension  I10 Well-controlled on Micardis 80 mg daily and Norvasc 5 mg daily.  Comprehensive Metabolic Panel      5. High cholesterol  E78.00 Uncontrolled with mildly elevated coronary artery calcium score.  Patient to repeat CT CARDIAC CALCIUM SCORING before considering starting a statin     Lipid Panel      6. Vitamin D deficiency  E55.9 Uncontrolled in the past but patient currently on significant vitamin D supplementation will check updated Vitamin D 25 Hydroxy      7. Breast cancer screening by mammogram  Z12.31 VICTORIA RUBIN DIGITAL SCREEN BILATERAL             Return in about 3 months (around 3/24/2025) for labs 1 week before.         Subjective   SUBJECTIVE/OBJECTIVE:  Sinus Pain  Patient complains of congestion, cough, facial pain, post nasal drip, and sinus pressure. Onset of symptoms was 1 week ago. Symptoms have been unchanged since that time. She is drinking plenty of fluids.  Past history is significant for  allergic rhinitis  . Patient is non-smoker.  Pt has been having lateral left leg pain for the last 2 months worst after sitting and at night. She does see some improvement with stretching. Pt does a lot of exercise including sparing.     Pt cholesterol has been elevated in the past with CAC score 34 in . The 10-year ASCVD risk

## 2024-12-24 NOTE — PROGRESS NOTES
Tien Persaud presents today for   Chief Complaint   Patient presents with    Sinusitis    Leg Pain     left           \"Have you been to the ER, urgent care clinic since your last visit? Yes Mannie Flores 08- Hospitalized since your last visit?\"    NO    “Have you seen or consulted any other health care providers outside of [facility] since your last visit?”    NO       Have you had a mammogram?”   NO    Date of last Mammogram: 11/16/2023      “Have you had a pap smear?”    NO    No cervical cancer screening on file

## 2024-12-27 DIAGNOSIS — I10 ESSENTIAL (PRIMARY) HYPERTENSION: ICD-10-CM

## 2024-12-27 RX ORDER — AMLODIPINE BESYLATE 5 MG/1
5 TABLET ORAL DAILY
Qty: 90 TABLET | Refills: 3 | Status: SHIPPED | OUTPATIENT
Start: 2024-12-27

## 2025-07-11 RX ORDER — TELMISARTAN 80 MG/1
80 TABLET ORAL DAILY
Qty: 90 TABLET | Refills: 3 | OUTPATIENT
Start: 2025-07-11

## 2025-07-11 RX ORDER — TELMISARTAN 80 MG/1
80 TABLET ORAL DAILY
Qty: 90 TABLET | Refills: 0 | Status: SHIPPED | OUTPATIENT
Start: 2025-07-11